# Patient Record
Sex: FEMALE | Race: ASIAN | NOT HISPANIC OR LATINO | URBAN - METROPOLITAN AREA
[De-identification: names, ages, dates, MRNs, and addresses within clinical notes are randomized per-mention and may not be internally consistent; named-entity substitution may affect disease eponyms.]

---
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
## 2019-09-15 ENCOUNTER — EMERGENCY (EMERGENCY)
Facility: HOSPITAL | Age: 19
LOS: 1 days | Discharge: HOME | End: 2019-09-15
Attending: EMERGENCY MEDICINE | Admitting: EMERGENCY MEDICINE
Payer: MEDICAID

## 2019-09-15 VITALS
HEART RATE: 101 BPM | RESPIRATION RATE: 16 BRPM | SYSTOLIC BLOOD PRESSURE: 139 MMHG | WEIGHT: 174.17 LBS | OXYGEN SATURATION: 98 % | DIASTOLIC BLOOD PRESSURE: 89 MMHG | TEMPERATURE: 99 F

## 2019-09-15 LAB
ALBUMIN SERPL ELPH-MCNC: 3.4 G/DL — SIGNIFICANT CHANGE UP (ref 3.4–5)
ALP SERPL-CCNC: 89 U/L — SIGNIFICANT CHANGE UP (ref 40–120)
ALT FLD-CCNC: 17 U/L — SIGNIFICANT CHANGE UP (ref 12–42)
ANION GAP SERPL CALC-SCNC: 8 MMOL/L — LOW (ref 9–16)
APPEARANCE UR: CLEAR — SIGNIFICANT CHANGE UP
AST SERPL-CCNC: 13 U/L — LOW (ref 15–37)
BASOPHILS NFR BLD AUTO: 0.2 % — SIGNIFICANT CHANGE UP (ref 0–2)
BILIRUB SERPL-MCNC: 0.2 MG/DL — SIGNIFICANT CHANGE UP (ref 0.2–1.2)
BILIRUB UR-MCNC: NEGATIVE — SIGNIFICANT CHANGE UP
BUN SERPL-MCNC: 15 MG/DL — SIGNIFICANT CHANGE UP (ref 7–23)
CALCIUM SERPL-MCNC: 9.8 MG/DL — SIGNIFICANT CHANGE UP (ref 8.5–10.5)
CHLORIDE SERPL-SCNC: 102 MMOL/L — SIGNIFICANT CHANGE UP (ref 96–108)
CO2 SERPL-SCNC: 29 MMOL/L — SIGNIFICANT CHANGE UP (ref 22–31)
COLOR SPEC: YELLOW — SIGNIFICANT CHANGE UP
CREAT SERPL-MCNC: 0.99 MG/DL — SIGNIFICANT CHANGE UP (ref 0.5–1.3)
DIFF PNL FLD: NEGATIVE — SIGNIFICANT CHANGE UP
EOSINOPHIL NFR BLD AUTO: 1.3 % — SIGNIFICANT CHANGE UP (ref 0–6)
GLUCOSE SERPL-MCNC: 280 MG/DL — HIGH (ref 70–99)
GLUCOSE UR QL: >=1000
HCG SERPL-ACNC: <1 MIU/ML — SIGNIFICANT CHANGE UP
HCG UR QL: NEGATIVE — SIGNIFICANT CHANGE UP
HCT VFR BLD CALC: 39.5 % — SIGNIFICANT CHANGE UP (ref 34.5–45)
HGB BLD-MCNC: 12.9 G/DL — SIGNIFICANT CHANGE UP (ref 11.5–15.5)
IMM GRANULOCYTES NFR BLD AUTO: 0.3 % — SIGNIFICANT CHANGE UP (ref 0–1.5)
KETONES UR-MCNC: NEGATIVE — SIGNIFICANT CHANGE UP
LEUKOCYTE ESTERASE UR-ACNC: NEGATIVE — SIGNIFICANT CHANGE UP
LIDOCAIN IGE QN: 129 U/L — SIGNIFICANT CHANGE UP (ref 73–393)
LYMPHOCYTES # BLD AUTO: 33.9 % — SIGNIFICANT CHANGE UP (ref 13–44)
MCHC RBC-ENTMCNC: 26.2 PG — LOW (ref 27–34)
MCHC RBC-ENTMCNC: 32.7 G/DL — SIGNIFICANT CHANGE UP (ref 32–36)
MCV RBC AUTO: 80.3 FL — SIGNIFICANT CHANGE UP (ref 80–100)
MONOCYTES NFR BLD AUTO: 6.1 % — SIGNIFICANT CHANGE UP (ref 2–14)
NEUTROPHILS NFR BLD AUTO: 58.2 % — SIGNIFICANT CHANGE UP (ref 43–77)
NITRITE UR-MCNC: NEGATIVE — SIGNIFICANT CHANGE UP
PH UR: 6 — SIGNIFICANT CHANGE UP (ref 5–8)
PLATELET # BLD AUTO: 273 K/UL — SIGNIFICANT CHANGE UP (ref 150–400)
POTASSIUM SERPL-MCNC: 4.3 MMOL/L — SIGNIFICANT CHANGE UP (ref 3.5–5.3)
POTASSIUM SERPL-SCNC: 4.3 MMOL/L — SIGNIFICANT CHANGE UP (ref 3.5–5.3)
PROT SERPL-MCNC: 7.6 G/DL — SIGNIFICANT CHANGE UP (ref 6.4–8.2)
PROT UR-MCNC: NEGATIVE MG/DL — SIGNIFICANT CHANGE UP
RBC # BLD: 4.92 M/UL — SIGNIFICANT CHANGE UP (ref 3.8–5.2)
RBC # FLD: 14.4 % — SIGNIFICANT CHANGE UP (ref 10.3–14.5)
SODIUM SERPL-SCNC: 139 MMOL/L — SIGNIFICANT CHANGE UP (ref 132–145)
SP GR SPEC: 1.02 — SIGNIFICANT CHANGE UP (ref 1–1.03)
UROBILINOGEN FLD QL: 0.2 E.U./DL — SIGNIFICANT CHANGE UP
WBC # BLD: 10.2 K/UL — SIGNIFICANT CHANGE UP (ref 3.8–10.5)
WBC # FLD AUTO: 10.2 K/UL — SIGNIFICANT CHANGE UP (ref 3.8–10.5)

## 2019-09-15 PROCEDURE — 99284 EMERGENCY DEPT VISIT MOD MDM: CPT

## 2019-09-15 RX ORDER — FLUCONAZOLE 150 MG/1
150 TABLET ORAL ONCE
Refills: 0 | Status: COMPLETED | OUTPATIENT
Start: 2019-09-15 | End: 2019-09-15

## 2019-09-15 RX ORDER — SODIUM CHLORIDE 9 MG/ML
1000 INJECTION INTRAMUSCULAR; INTRAVENOUS; SUBCUTANEOUS ONCE
Refills: 0 | Status: COMPLETED | OUTPATIENT
Start: 2019-09-15 | End: 2019-09-15

## 2019-09-15 RX ADMIN — FLUCONAZOLE 150 MILLIGRAM(S): 150 TABLET ORAL at 23:38

## 2019-09-15 RX ADMIN — Medication 30 MILLILITER(S): at 23:00

## 2019-09-15 RX ADMIN — SODIUM CHLORIDE 1000 MILLILITER(S): 9 INJECTION INTRAMUSCULAR; INTRAVENOUS; SUBCUTANEOUS at 22:22

## 2019-09-15 NOTE — ED PROVIDER NOTE - NSFOLLOWUPINSTRUCTIONS_ED_ALL_ED_FT
arrange follow up with your doctor this week to start treatment for diabetes     stay well hydrated    return to er for increased pain or any other concern

## 2019-09-15 NOTE — ED PROVIDER NOTE - PATIENT PORTAL LINK FT
You can access the FollowMyHealth Patient Portal offered by [facility] by registering at the following website: [email or web address]. By joining [email or web address]’s FollowMyHealth portal, you will also be able to view your health information using other applications (apps) compatible with our system.

## 2019-09-15 NOTE — ED ADULT TRIAGE NOTE - CHIEF COMPLAINT QUOTE
generalized abdominal pain x3day +nausea no vomiting endorses she was recently diagnosed with DM Type 2 and not on any medications and does not check her BS  denies fever chills or urinary symptoms LMP 8/20

## 2019-09-15 NOTE — ED PROVIDER NOTE - CARE PLAN
Principal Discharge DX:	Diabetes mellitus  Secondary Diagnosis:	Vaginal yeast infection  Secondary Diagnosis:	Abdominal pain, non-surgical

## 2019-09-15 NOTE — ED PROVIDER NOTE - CPE EDP GASTRO NORM
your ankle (not more than 5 pounds). With weight, you do not have to lift your leg more than 12 inches to get a hamstring workout. Shallow standing knee bends    Do this exercise only if you have very little pain; if you have no clicking, locking, or giving way if you have an injured knee; and if it does not hurt while you are doing 8 to 12 repetitions. 1. Stand with your hands lightly resting on a counter or chair in front of you. Put your feet shoulder-width apart. 2. Slowly bend your knees so that you squat down like you are going to sit in a chair. Make sure your knees do not go in front of your toes. 3. Lower yourself about 6 inches. Your heels should remain on the floor at all times. 4. Rise slowly to a standing position. Heel raises    1. Stand with your feet a few inches apart, with your hands lightly resting on a counter or chair in front of you. 2. Slowly raise your heels off the floor while keeping your knees straight. 3. Hold for about 6 seconds, then slowly lower your heels to the floor. 4. Do 8 to 12 repetitions several times during the day. Follow-up care is a key part of your treatment and safety. Be sure to make and go to all appointments, and call your doctor if you are having problems. It's also a good idea to know your test results and keep a list of the medicines you take. Where can you learn more? Go to [email or web address] and sign in to your [email or web address] account. Enter W198 in the [facility] box to learn more about \"Knee: Exercises. \"     If you do not have an account, please click on the \"Sign Up Now\" link. Current as of: November 29, 2017  Content Version: 11.7  © 3126-1863 [employer]. Care instructions adapted under license by [facility] ([facility]).  If you have questions about a medical condition or this instruction, always ask your healthcare professional. [address] any warranty or liability for your use of this
normal...

## 2019-09-15 NOTE — ED PROVIDER NOTE - OBJECTIVE STATEMENT
20 yo woman w/ complaint of 3 day of non progressive mid abdominal pain.  no n/v/d no fever no cp no sob no back pain. no dysuria or hematuria  no prior abd surgery

## 2019-09-15 NOTE — ED PROVIDER NOTE - CLINICAL SUMMARY MEDICAL DECISION MAKING FREE TEXT BOX
The patient's symptoms progressively improved throughout the ED stay.  abdominal pain resolved ED evaluation and management discussed with the patient and family (if available) in detail.  Close PMD and/or specialist follow up encouraged.  Strict ED return instructions discussed in detail for any worsening or new symptoms. The patient was given the opportunity to ask any questions about their discharge diagnosis and discharge instructions.  The patient verbalized understanding of these instructions and need to return to the ED for any worsening of illness or for any concern. The patient understands Emergency Department diagnosis is a preliminary diagnosis often based on limited information and that the patient must adhere to the follow-up plan as discussed. The patient tolerated PO fluids.  At the time of discharge from the Emergency Department, the patient is alert with fluent appropriate speech and ambulatory without difficulty.  A medical screening examination was performed and no emergency medical condition was identified.

## 2019-09-19 DIAGNOSIS — B37.3 CANDIDIASIS OF VULVA AND VAGINA: ICD-10-CM

## 2019-09-19 DIAGNOSIS — E11.9 TYPE 2 DIABETES MELLITUS WITHOUT COMPLICATIONS: ICD-10-CM

## 2019-09-19 DIAGNOSIS — R10.9 UNSPECIFIED ABDOMINAL PAIN: ICD-10-CM

## 2020-06-23 ENCOUNTER — EMERGENCY (EMERGENCY)
Facility: HOSPITAL | Age: 20
LOS: 1 days | Discharge: HOME | End: 2020-06-23
Attending: EMERGENCY MEDICINE | Admitting: EMERGENCY MEDICINE
Payer: MEDICAID

## 2020-06-23 VITALS
SYSTOLIC BLOOD PRESSURE: 129 MMHG | DIASTOLIC BLOOD PRESSURE: 76 MMHG | WEIGHT: 177.91 LBS | TEMPERATURE: 99 F | HEIGHT: 63 IN | OXYGEN SATURATION: 98 % | RESPIRATION RATE: 17 BRPM | HEART RATE: 105 BPM

## 2020-06-23 DIAGNOSIS — N30.90 CYSTITIS, UNSPECIFIED WITHOUT HEMATURIA: ICD-10-CM

## 2020-06-23 DIAGNOSIS — R30.0 DYSURIA: ICD-10-CM

## 2020-06-23 PROCEDURE — 99284 EMERGENCY DEPT VISIT MOD MDM: CPT

## 2020-06-23 RX ORDER — NITROFURANTOIN MACROCRYSTAL 50 MG
1 CAPSULE ORAL
Qty: 10 | Refills: 0
Start: 2020-06-23 | End: 2020-06-27

## 2020-06-23 RX ORDER — PHENAZOPYRIDINE HCL 100 MG
1 TABLET ORAL
Qty: 6 | Refills: 0
Start: 2020-06-23 | End: 2020-06-24

## 2020-06-23 RX ORDER — NITROFURANTOIN MACROCRYSTAL 50 MG
100 CAPSULE ORAL ONCE
Refills: 0 | Status: COMPLETED | OUTPATIENT
Start: 2020-06-23 | End: 2020-06-23

## 2020-06-23 RX ORDER — PHENAZOPYRIDINE HCL 100 MG
200 TABLET ORAL ONCE
Refills: 0 | Status: COMPLETED | OUTPATIENT
Start: 2020-06-23 | End: 2020-06-23

## 2020-06-23 RX ADMIN — Medication 100 MILLIGRAM(S): at 21:31

## 2020-06-23 RX ADMIN — Medication 200 MILLIGRAM(S): at 21:31

## 2020-06-23 NOTE — ED PROVIDER NOTE - OBJECTIVE STATEMENT
States that today she experience urinary frequency, urgency, dysuria, and hematuria.  Describes urine color as "pink"  Denies nausea, vomiting fever, chills or back pain.  Sexually active with one partner.  Denies hx of STIs.  No close contact with known or suspected COVID person(s).

## 2020-06-23 NOTE — ED PROVIDER NOTE - PATIENT PORTAL LINK FT
You can access the FollowMyHealth Patient Portal offered by [facility] by registering at the following website: [email or web address]. By joining [employer]’s FollowMyHealth portal, you will also be able to view your health information using other applications (apps) compatible with our system.

## 2020-06-23 NOTE — ED ADULT NURSE NOTE - NSIMPLEMENTINTERV_GEN_ALL_ED
Implemented All Universal Safety Interventions:  [place] to call system. Call bell, personal items and telephone within reach. Instruct patient to call for assistance. Room bathroom lighting operational. Non-slip footwear when patient is off stretcher. Physically safe environment: no spills, clutter or unnecessary equipment. Stretcher in lowest position, wheels locked, appropriate side rails in place.

## 2020-06-23 NOTE — ED ADULT NURSE NOTE - OBJECTIVE STATEMENT
c/o vaginal itching dysuria and hematuria x 1 day. Pt denies F,chills,n,v,cough,sob. sexually active.

## 2020-06-23 NOTE — ED PROVIDER NOTE - CLINICAL SUMMARY MEDICAL DECISION MAKING FREE TEXT BOX
UA consistent with cystitis.  Will treat empirically without urine culture.  Denies fever, chills, back pain.  Conservative management discussed with the patient in detail.  Close PMD follow up encouraged.  Strict ED return instructions discussed in detail and patient given the opportunity to ask any questions about their discharge diagnosis and instructions UA consistent with cystitis (possible hemorraghic component).  Will treat empirically with Macrobid without urine culture.  Denies fever, chills, back pain.  Conservative management discussed with the patient in detail.  Close PMD follow up encouraged.  Strict ED return instructions discussed in detail and patient given the opportunity to ask any questions about their discharge diagnosis and instructions

## 2020-10-17 ENCOUNTER — EMERGENCY (EMERGENCY)
Facility: HOSPITAL | Age: 20
LOS: 1 days | Discharge: HOME | End: 2020-10-17
Admitting: EMERGENCY MEDICINE
Payer: MEDICAID

## 2020-10-17 VITALS
DIASTOLIC BLOOD PRESSURE: 78 MMHG | HEART RATE: 92 BPM | RESPIRATION RATE: 18 BRPM | WEIGHT: 179.9 LBS | TEMPERATURE: 98 F | HEIGHT: 63 IN | SYSTOLIC BLOOD PRESSURE: 113 MMHG | OXYGEN SATURATION: 98 %

## 2020-10-17 DIAGNOSIS — R10.30 LOWER ABDOMINAL PAIN, UNSPECIFIED: ICD-10-CM

## 2020-10-17 DIAGNOSIS — N39.0 URINARY TRACT INFECTION, SITE NOT SPECIFIED: ICD-10-CM

## 2020-10-17 LAB
APPEARANCE UR: CLEAR — SIGNIFICANT CHANGE UP
BACTERIA # UR AUTO: PRESENT /HPF
BILIRUB UR-MCNC: NEGATIVE — SIGNIFICANT CHANGE UP
COLOR SPEC: YELLOW — SIGNIFICANT CHANGE UP
DIFF PNL FLD: NEGATIVE — SIGNIFICANT CHANGE UP
EPI CELLS # UR: SIGNIFICANT CHANGE UP /HPF (ref 0–5)
GLUCOSE UR QL: NEGATIVE — SIGNIFICANT CHANGE UP
HCG UR QL: NEGATIVE — SIGNIFICANT CHANGE UP
KETONES UR-MCNC: NEGATIVE — SIGNIFICANT CHANGE UP
LEUKOCYTE ESTERASE UR-ACNC: ABNORMAL
NITRITE UR-MCNC: NEGATIVE — SIGNIFICANT CHANGE UP
PH UR: 5.5 — SIGNIFICANT CHANGE UP (ref 5–8)
PROT UR-MCNC: NEGATIVE MG/DL — SIGNIFICANT CHANGE UP
RBC CASTS # UR COMP ASSIST: < 5 /HPF — SIGNIFICANT CHANGE UP
SP GR SPEC: >=1.03 — SIGNIFICANT CHANGE UP (ref 1–1.03)
UROBILINOGEN FLD QL: 0.2 E.U./DL — SIGNIFICANT CHANGE UP
WBC UR QL: ABNORMAL /HPF

## 2020-10-17 PROCEDURE — 99283 EMERGENCY DEPT VISIT LOW MDM: CPT

## 2020-10-17 RX ORDER — CEFUROXIME AXETIL 250 MG
250 TABLET ORAL ONCE
Refills: 0 | Status: COMPLETED | OUTPATIENT
Start: 2020-10-17 | End: 2020-10-17

## 2020-10-17 RX ORDER — CEFUROXIME AXETIL 250 MG
1 TABLET ORAL
Qty: 14 | Refills: 0
Start: 2020-10-17 | End: 2020-10-23

## 2020-10-17 RX ORDER — CEFUROXIME AXETIL 250 MG
1 TABLET ORAL
Qty: 14 | Refills: 0
Start: 2020-10-17 | End: 2021-04-16

## 2020-10-17 RX ADMIN — Medication 250 MILLIGRAM(S): at 15:56

## 2020-10-17 NOTE — ED ADULT NURSE NOTE - OBJECTIVE STATEMENT
Patient presented to the ED with cc of dysuria and increased frequency x several days. Patient denies vaginal pain, itching, burning or discharge. Patient able to provide a urine sample.

## 2020-10-17 NOTE — ED PROVIDER NOTE - ADDITIONAL RISK FACTOR FREE TEXT BOX
Denies fever, chills, hematuria, vaginal bleeding, d/c, abdominal pain, change in bowel function, flank pain, rash, HA, dizziness, SOB, CP, palpitations, diaphoresis, cough, and malaise.

## 2020-10-17 NOTE — ED PROVIDER NOTE - CLINICAL SUMMARY MEDICAL DECISION MAKING FREE TEXT BOX
Patient c/o burning with urination and lower abdominal pain x 1 week. Will obtain UA and culture. Anticipate discharge.

## 2020-10-17 NOTE — ED PROVIDER NOTE - PATIENT PORTAL LINK FT
You can access the FollowMyHealth Patient Portal offered by [facility] by registering at the following website: [email or web address]. By joining [employer]’s FollowMyHealth portal, you will also be able to view your health information using other applications (apps) compatible with our system.

## 2020-10-17 NOTE — ED ADULT NURSE NOTE - CHIEF COMPLAINT QUOTE
Pt complaining of burning with urination and lower abdominal pain x 1 week. Pt denies hematuria. LMP 9/16

## 2020-10-17 NOTE — ED PROVIDER NOTE - OBJECTIVE STATEMENT
21 y/o female presents to the ED with complaints of burning with urination and lower abdominal pain x 1 week. LN 9/16/20. Denies fever, chills, hematuria, vaginal bleeding, discharge, flank pain.

## 2021-04-08 ENCOUNTER — EMERGENCY (EMERGENCY)
Facility: HOSPITAL | Age: 21
LOS: 1 days | Discharge: HOME | End: 2021-04-08
Attending: EMERGENCY MEDICINE | Admitting: EMERGENCY MEDICINE
Payer: MEDICAID

## 2021-04-08 VITALS
HEART RATE: 100 BPM | RESPIRATION RATE: 16 BRPM | WEIGHT: 179.9 LBS | DIASTOLIC BLOOD PRESSURE: 75 MMHG | OXYGEN SATURATION: 99 % | SYSTOLIC BLOOD PRESSURE: 107 MMHG | HEIGHT: 63 IN | TEMPERATURE: 98 F

## 2021-04-08 DIAGNOSIS — R51.9 HEADACHE, UNSPECIFIED: ICD-10-CM

## 2021-04-08 DIAGNOSIS — Z79.2 LONG TERM (CURRENT) USE OF ANTIBIOTICS: ICD-10-CM

## 2021-04-08 DIAGNOSIS — U07.1 COVID-19: ICD-10-CM

## 2021-04-08 DIAGNOSIS — E11.9 TYPE 2 DIABETES MELLITUS WITHOUT COMPLICATIONS: ICD-10-CM

## 2021-04-08 DIAGNOSIS — Z79.891 LONG TERM (CURRENT) USE OF OPIATE ANALGESIC: ICD-10-CM

## 2021-04-08 LAB
ALBUMIN SERPL ELPH-MCNC: 3.8 G/DL — SIGNIFICANT CHANGE UP (ref 3.4–5)
ALP SERPL-CCNC: 74 U/L — SIGNIFICANT CHANGE UP (ref 40–120)
ALT FLD-CCNC: 19 U/L — SIGNIFICANT CHANGE UP (ref 12–42)
ANION GAP SERPL CALC-SCNC: 9 MMOL/L — SIGNIFICANT CHANGE UP (ref 9–16)
APPEARANCE UR: CLEAR — SIGNIFICANT CHANGE UP
AST SERPL-CCNC: 13 U/L — LOW (ref 15–37)
BASOPHILS # BLD AUTO: 0.02 K/UL — SIGNIFICANT CHANGE UP (ref 0–0.2)
BASOPHILS NFR BLD AUTO: 0.4 % — SIGNIFICANT CHANGE UP (ref 0–2)
BILIRUB SERPL-MCNC: 0.2 MG/DL — SIGNIFICANT CHANGE UP (ref 0.2–1.2)
BILIRUB UR-MCNC: ABNORMAL
BUN SERPL-MCNC: 10 MG/DL — SIGNIFICANT CHANGE UP (ref 7–23)
CALCIUM SERPL-MCNC: 9 MG/DL — SIGNIFICANT CHANGE UP (ref 8.5–10.5)
CHLORIDE SERPL-SCNC: 102 MMOL/L — SIGNIFICANT CHANGE UP (ref 96–108)
CO2 SERPL-SCNC: 26 MMOL/L — SIGNIFICANT CHANGE UP (ref 22–31)
COLOR SPEC: YELLOW — SIGNIFICANT CHANGE UP
CREAT SERPL-MCNC: 0.96 MG/DL — SIGNIFICANT CHANGE UP (ref 0.5–1.3)
DIFF PNL FLD: NEGATIVE — SIGNIFICANT CHANGE UP
EOSINOPHIL # BLD AUTO: 0.02 K/UL — SIGNIFICANT CHANGE UP (ref 0–0.5)
EOSINOPHIL NFR BLD AUTO: 0.4 % — SIGNIFICANT CHANGE UP (ref 0–6)
GLUCOSE SERPL-MCNC: 193 MG/DL — HIGH (ref 70–99)
GLUCOSE UR QL: NEGATIVE — SIGNIFICANT CHANGE UP
HCG UR QL: NEGATIVE — SIGNIFICANT CHANGE UP
HCT VFR BLD CALC: 43.5 % — SIGNIFICANT CHANGE UP (ref 34.5–45)
HGB BLD-MCNC: 14.1 G/DL — SIGNIFICANT CHANGE UP (ref 11.5–15.5)
IMM GRANULOCYTES NFR BLD AUTO: 0.4 % — SIGNIFICANT CHANGE UP (ref 0–1.5)
KETONES UR-MCNC: NEGATIVE — SIGNIFICANT CHANGE UP
LEUKOCYTE ESTERASE UR-ACNC: NEGATIVE — SIGNIFICANT CHANGE UP
LIDOCAIN IGE QN: 72 U/L — LOW (ref 73–393)
LYMPHOCYTES # BLD AUTO: 1.68 K/UL — SIGNIFICANT CHANGE UP (ref 1–3.3)
LYMPHOCYTES # BLD AUTO: 35.2 % — SIGNIFICANT CHANGE UP (ref 13–44)
MCHC RBC-ENTMCNC: 26.2 PG — LOW (ref 27–34)
MCHC RBC-ENTMCNC: 32.4 GM/DL — SIGNIFICANT CHANGE UP (ref 32–36)
MCV RBC AUTO: 80.7 FL — SIGNIFICANT CHANGE UP (ref 80–100)
MONOCYTES # BLD AUTO: 0.36 K/UL — SIGNIFICANT CHANGE UP (ref 0–0.9)
MONOCYTES NFR BLD AUTO: 7.5 % — SIGNIFICANT CHANGE UP (ref 2–14)
NEUTROPHILS # BLD AUTO: 2.67 K/UL — SIGNIFICANT CHANGE UP (ref 1.8–7.4)
NEUTROPHILS NFR BLD AUTO: 56.1 % — SIGNIFICANT CHANGE UP (ref 43–77)
NITRITE UR-MCNC: NEGATIVE — SIGNIFICANT CHANGE UP
NRBC # BLD: 0 /100 WBCS — SIGNIFICANT CHANGE UP (ref 0–0)
PH UR: 6 — SIGNIFICANT CHANGE UP (ref 5–8)
PLATELET # BLD AUTO: 247 K/UL — SIGNIFICANT CHANGE UP (ref 150–400)
POTASSIUM SERPL-MCNC: 4.1 MMOL/L — SIGNIFICANT CHANGE UP (ref 3.5–5.3)
POTASSIUM SERPL-SCNC: 4.1 MMOL/L — SIGNIFICANT CHANGE UP (ref 3.5–5.3)
PROT SERPL-MCNC: 8 G/DL — SIGNIFICANT CHANGE UP (ref 6.4–8.2)
PROT UR-MCNC: ABNORMAL MG/DL
RBC # BLD: 5.39 M/UL — HIGH (ref 3.8–5.2)
RBC # FLD: 15.2 % — HIGH (ref 10.3–14.5)
SARS-COV-2 RNA SPEC QL NAA+PROBE: DETECTED
SODIUM SERPL-SCNC: 137 MMOL/L — SIGNIFICANT CHANGE UP (ref 132–145)
SP GR SPEC: >=1.03 — SIGNIFICANT CHANGE UP (ref 1–1.03)
UROBILINOGEN FLD QL: 0.2 E.U./DL — SIGNIFICANT CHANGE UP
WBC # BLD: 4.77 K/UL — SIGNIFICANT CHANGE UP (ref 3.8–10.5)
WBC # FLD AUTO: 4.77 K/UL — SIGNIFICANT CHANGE UP (ref 3.8–10.5)

## 2021-04-08 PROCEDURE — 93010 ELECTROCARDIOGRAM REPORT: CPT

## 2021-04-08 PROCEDURE — 71045 X-RAY EXAM CHEST 1 VIEW: CPT | Mod: 26

## 2021-04-08 PROCEDURE — 99285 EMERGENCY DEPT VISIT HI MDM: CPT | Mod: 25

## 2021-04-08 RX ORDER — MECLIZINE HCL 12.5 MG
25 TABLET ORAL ONCE
Refills: 0 | Status: DISCONTINUED | OUTPATIENT
Start: 2021-04-08 | End: 2021-04-08

## 2021-04-08 RX ORDER — ACETAMINOPHEN 500 MG
650 TABLET ORAL ONCE
Refills: 0 | Status: COMPLETED | OUTPATIENT
Start: 2021-04-08 | End: 2021-04-08

## 2021-04-08 RX ORDER — SODIUM CHLORIDE 9 MG/ML
1000 INJECTION INTRAMUSCULAR; INTRAVENOUS; SUBCUTANEOUS ONCE
Refills: 0 | Status: COMPLETED | OUTPATIENT
Start: 2021-04-08 | End: 2021-04-08

## 2021-04-08 RX ADMIN — SODIUM CHLORIDE 1000 MILLILITER(S): 9 INJECTION INTRAMUSCULAR; INTRAVENOUS; SUBCUTANEOUS at 11:31

## 2021-04-08 RX ADMIN — Medication 650 MILLIGRAM(S): at 11:30

## 2021-04-08 NOTE — ED PROVIDER NOTE - CLINICAL SUMMARY MEDICAL DECISION MAKING FREE TEXT BOX
20F w/ T2DM p/w body aches, chest fullness, subjective fevers, nausea, no known covid contacts, FS 170s here, no abdominal ttp, doubt surgical pathology, doubt dka, vss, will obtain labs, hydrate, cxr, covid swab, reassess

## 2021-04-08 NOTE — ED PROVIDER NOTE - OBJECTIVE STATEMENT
20F w/ h/o T2DM p/w chest 'fullness', back aches, muscle aches throughout, nausea, headache. States she noted her sugars were in the 200s yesterday, improved today. Denies vomiting, abdominal pain. Endorses dysuria, denies polyuria/polydipsia.

## 2021-04-08 NOTE — ED PROVIDER NOTE - NSFOLLOWUPINSTRUCTIONS_ED_ALL_ED_FT
COVID-19 is the disease caused by a coronavirus first discovered in 2019. Coronaviruses generally cause upper respiratory (nose, throat, and lung) infections, such as a cold. The new virus can also cause serious lower respiratory conditions, such as pneumonia or acute respiratory distress syndrome (ARDS). The new virus can also affect many other organs, including the brain and heart. Blood vessels can also be affected, leading to blood clots. Anyone can develop serious problems from the new virus, but your risk is higher if you are 65 or older. A weak immune system, diabetes, or a heart or lung condition can also increase your risk. Your risk is also higher if you are a current or former cigarette smoker.    DISCHARGE INSTRUCTIONS:    If you think you or someone you know may be infected: Do the following to protect others:   •If emergency care is needed, tell the  about the possible infection, or call ahead and tell the emergency department.      •Call a healthcare provider for instructions if symptoms are mild. Anyone who may be infected should not arrive without calling first. The provider will need to protect staff members and other patients.      •The person who may be infected needs to wear a face covering while getting medical care. This will help lower the risk of infecting others. Coverings are not used for anyone who is younger than 2 years, has breathing problems, or cannot remove it. The provider can give you instructions for anyone who cannot wear a covering.      Call your local emergency number (911 in the [place]) or an emergency department if:   •You have trouble breathing or shortness of breath at rest.      •You have chest pain or pressure that lasts longer than 5 minutes.      •You become confused or hard to wake.      •Your lips or face are blue.      •You have a fever of 104°F (40°C) or higher.      Call your doctor if:   •You do not have symptoms of COVID-19 but had close physical contact within 14 days with someone who tested positive.      •You have questions or concerns about your condition or care.      Medicines: You may need any of the following for mild symptoms:   •Decongestants help reduce nasal congestion and help you breathe more easily. If you take decongestant pills, they may make you feel restless or cause problems with your sleep. Do not use decongestant sprays for more than a few days.      •Cough suppressants help reduce coughing. Ask your healthcare provider which type of cough medicine is best for you.      •Acetaminophen decreases pain and fever. It is available without a doctor's order. Ask how much to take and how often to take it. Follow directions. Read the labels of all other medicines you are using to see if they also contain acetaminophen, or ask your doctor or pharmacist. Acetaminophen can cause liver damage if not taken correctly. Do not use more than 4 grams (4,000 milligrams) total of acetaminophen in one day.       •NSAIDs, such as ibuprofen, help decrease swelling, pain, and fever. NSAIDs can cause stomach bleeding or kidney problems in certain people. If you take blood thinner medicine, always ask your healthcare provider if NSAIDs are safe for you. Always read the medicine label and follow directions.      •Take your medicine as directed. Contact your healthcare provider if you think your medicine is not helping or if you have side effects. Tell him or her if you are allergic to any medicine. Keep a list of the medicines, vitamins, and herbs you take. Include the amounts, and when and why you take them. Bring the list or the pill bottles to follow-up visits. Carry your medicine list with you in case of an emergency.      How the 2019 coronavirus spreads: The virus spreads quickly and easily. The virus can be passed starting 2 days before symptoms begin or before a positive test if symptoms never begin. The following are ways the virus is thought to spread, but more information may be coming:   •Droplets are the main way all coronaviruses spread. The virus travels in droplets that form when a person talks, coughs, or sneezes. The droplets can also float in the air for minutes or hours. Infection happens when you breathe in the droplets or get them in your eyes or nose. Close personal contact with an infected person increases your risk for infection. This means being within 6 feet (2 meters) of the person for at least 15 minutes over 24 hours.      •Person-to-person contact can spread the virus. For example, a person with the virus on his or her hands can spread it by shaking hands with someone.      •The virus can stay on objects and surfaces for a short time. You may become infected by touching the object or surface and then touching your eyes or mouth.      •An infected animal may be able to infect a person who touches it. This may happen at live markets or on a farm.      Help lower the risk for COVID-19: The best way to prevent infection is to avoid anyone who is infected, but this can be hard to do. An infected person can spread the virus before signs or symptoms begin, or even if signs or symptoms never develop. The following can help lower the risk for infection:     Prevent COVID-19 Infection     •Wash your hands often throughout the day. Use soap and water. Rub your soapy hands together, lacing your fingers, for at least 20 seconds. Rinse with warm, running water. Dry your hands with a clean towel or paper towel. Use hand  that contains alcohol if soap and water are not available. Teach children how to wash their hands and use hand .  Handwashing           •Cover sneezes and coughs. Turn your face away and cover your mouth and nose with a tissue. Throw the tissue away. Use the bend of your arm if a tissue is not available. Then wash your hands well with soap and water or use hand . Teach children how to cover a cough or sneeze.      •Wear a face covering (mask) around anyone who does not live in your home. Use a disposable non-medical mask, or make a cloth covering with at least 2 layers. Cover your mouth and your nose. Securely fasten it under your chin and on the sides of your face. Do not wear a plastic face shield instead of a covering. Continue social distancing and washing your hands often. A face covering is not a substitute for social distancing safety measures.  How to Wear a Face Covering (Mask)           •Follow worldwide, national, and local social distancing guidelines. Keep at least 6 feet (2 meters) between you and others. Also keep this distance from anyone who comes to your home, such as someone making a delivery.      •Make a habit of not touching your face. If you get the virus on your hands, you can transfer it to your eyes, nose, or mouth and become infected. You can also transfer it to objects, surfaces, or people. Do not touch your eyes, nose, or mouth without washing your hands first.      •Clean and disinfect high-touch surfaces and objects often. Use disinfecting wipes, or make a solution of 4 teaspoons of bleach in 1 quart (4 cups) of water. Clean and disinfect even if you think no one living in or coming to your home is infected with the virus.      •Ask about vaccines you may need. A COVID-19 vaccine is a shot given to help prevent infection caused by the novel coronavirus. Your healthcare provider can give you more information about when a vaccine may be available to you. Get the influenza (flu) vaccine as soon as recommended each year, usually starting in September or October. Get the pneumonia vaccine if recommended.      Follow social distancing guidelines: National and local social distancing rules vary. Rules may change over time as restrictions are lifted. Restrictions may return if an outbreak happens where you live. It is important to know and follow all current social distancing rules in your area. The following are general guidelines:  •Stay home if you are sick or think you may have COVID-19. It is important to stay home if you are waiting for a testing appointment or for test results. Even if you do not have symptoms, you can pass the virus to others.      •Limit trips out of your home. Have food, medicines, and other supplies delivered and left at your door or other area, if possible. Plan trips out of your home so you make the fewest stops possible to limit close personal contact. Keep track of places you go. This will help contact tracers notify others if you become infected.      •Avoid close physical contact with anyone who does not live in your home. Do not shake hands with, hug, or kiss a person as a greeting. If you must use public transportation (such as a bus or subway), try to sit or stand away from others. Only allow necessary people into your home. Wear your face covering, and remind others to wear a face covering. Remind them to wash their hands when they arrive and before they leave. Do not let someone into your home or go to someone's home just to visit. Even if you both do not feel sick, the virus can pass from one of you to the other.      •Avoid in-person gatherings and crowds. Gatherings or crowds of 10 or more individuals can cause the virus to spread. Avoid places such as [place], beaches, sporting events, and tourist attractions. For events such as parties, holiday meals, Zoroastrian services, and conferences, attend virtually (on a computer), if possible.      •Ask your healthcare provider for other ways to have appointments. Some providers offer phone, video, or other types of appointments. You may also be able to get prescriptions for a few months of your medicines at a time.      •Stay safe if you must go out to work. Keep physical distance between you and other workers as much as possible. Follow your employer's rules so everyone stays safe.      If you have COVID-19 and are recovering at home, healthcare providers will give you specific instructions to follow. The following are general guidelines to remind you how to keep others safe until you are well:   •Wash your hands often. Use soap and water as much as possible. Use hand  that contains alcohol if soap and water are not available. Dry your hands with a clean towel or paper towel. Do not share towels with anyone. If you use paper towels, throw them away in a lined trash can kept in your room or area. Use a covered trash can, if possible.      •Do not go out of your home unless it is necessary. Ask someone who is not infected to go out for groceries or supplies, or have them delivered. Do not go to your healthcare provider's office without an appointment.      •Only have close physical contact with a person giving direct care, or a baby or child you must care for. Family members and friends should not visit you. If possible, stay in a separate area or room of your home if you live with others. No one should go into the area or room except to give you care. You can visit with others by phone, video chat, e-mail, or similar systems.      •Wear a face covering while others are near you. This can help prevent droplets from spreading the virus when you talk, sneeze, or cough. Put the covering on before anyone comes into your room or area. Remind the person to cover his or her nose and mouth before coming in to provide care for you.      •Do not share items. Do not share dishes, towels, or other items with anyone. Items need to be washed after you use them.      •Protect your baby. Some newborns have tested positive for the virus. It is not known if they became infected before or after birth. The highest risk is when a  has close contact with an infected person. If you are pregnant or breastfeeding, talk to your healthcare provider or obstetrician about any concerns you have. He or she will tell you when to bring your baby in for check-ups and vaccines. He or she will also tell you what to do if you think your baby was infected with the coronavirus. Wash your hands and put on a clean face covering before you breastfeed or care for your baby.      •Do not handle live animals unless it is necessary. Some animals, including pets, have been infected with the new coronavirus. Ask someone who is not infected to take care of your pet until you are well. If you must care for a pet, wear a face covering. Wash your hands before and after you give care. Talk to your healthcare provider about how to keep a service animal safe, if needed.      •Follow directions from your healthcare provider for being around others after you recover. It is not known if or for how long a recovered person can pass the virus to others. Your provider may give you instructions, such as continuing social distancing and wearing a face covering. He or she will tell you when it is okay to be around others again. This may be 10 to 20 days after symptoms started or you had a positive test. Most symptoms will also need to be gone. Your provider will give you more information.      Follow up with your doctor as directed: Write down your questions so you remember to ask them during your visits.    For more information:   •Centers for Disease Control and Prevention  [address] [place] [address]  Phone: 1-863.953.7214  Web Address: http://www.cdc.gov    - Follow up with your primary care doctor in 1-2 days.    - Bring results with you to the appointment.   - Take tylenol 650mg or motrin 600mg every 6 hours for pain or fever.   - Return to the ED for new or worsening symptoms.

## 2021-04-08 NOTE — ED PROVIDER NOTE - PATIENT PORTAL LINK FT
You can access the FollowMyHealth Patient Portal offered by [facility] by registering at the following website: [email or web address]. By joining [employer]’s FollowMyHealth portal, you will also be able to view your health information using other applications (apps) compatible with our system.

## 2021-04-08 NOTE — ED PROVIDER NOTE - PROGRESS NOTE DETAILS
Perkins: patient feeling better, offered monoclonal ab, states she will consider it, return precautions provided

## 2021-04-08 NOTE — ED PROVIDER NOTE - NS ED ROS FT
General: + fever, + chills  HENT: + nasal congestion, + rhinorrhea  CV: + chest fullness, denies palpitations   Resp: denies difficulty breathing, cough  Abdominal: + nausea, denies vomiting, diarrhea, abdominal pain  MSK: + muscle aches, denies leg swelling  Neuro: denies headaches, numbness, tingling  Skin: denies rashes, bruises  Heme: denies petechia, abnormal bleeding

## 2021-04-08 NOTE — ED PROVIDER NOTE - ATTENDING CONTRIBUTION TO CARE
21 yo female with type 2 DM COVID (+), obese, labs OK, offered ab infusion states she will consider it and requested to go home. Rx supportive care at home, return for progressive or worsening sx.

## 2021-04-08 NOTE — ED ADULT NURSE NOTE - OBJECTIVE STATEMENT
Pt walked in c/o chest pain that radiates to upper back and burning upon urination on and off for 1 x day hx dm 2 in no acute distress denies any respiratory issues adrien shaffer

## 2021-07-16 NOTE — ED ADULT NURSE NOTE - NS ED NURSE LEVEL OF CONSCIOUSNESS ORIENTATION
If provider orders tests at today's visit, patient would like to be contacted via [employer].  If to contact patient by phone, patient's preferred phone # is [phone] (Cell) and it is OK to leave message on voice mail or with family member.  If medications are ordered at today's visit, the pharmacy name/location patient would like them to be sent to is   [id number] - [place] - [address] AT [facility]  [address] [address]  Phone: [phone] Fax: [phone]         Oriented - self; Oriented - place; Oriented - time

## 2022-05-10 ENCOUNTER — EMERGENCY (EMERGENCY)
Facility: HOSPITAL | Age: 22
LOS: 1 days | Discharge: HOME | End: 2022-05-10
Admitting: EMERGENCY MEDICINE
Payer: MEDICAID

## 2022-05-10 VITALS
DIASTOLIC BLOOD PRESSURE: 81 MMHG | WEIGHT: 177.91 LBS | HEIGHT: 63 IN | SYSTOLIC BLOOD PRESSURE: 125 MMHG | TEMPERATURE: 98 F | RESPIRATION RATE: 15 BRPM | HEART RATE: 96 BPM | OXYGEN SATURATION: 98 %

## 2022-05-10 PROCEDURE — 99283 EMERGENCY DEPT VISIT LOW MDM: CPT

## 2022-05-10 RX ORDER — NYSTATIN CREAM 100000 [USP'U]/G
1 CREAM TOPICAL ONCE
Refills: 0 | Status: COMPLETED | OUTPATIENT
Start: 2022-05-10 | End: 2022-05-10

## 2022-05-10 RX ORDER — FLUCONAZOLE 150 MG/1
150 TABLET ORAL ONCE
Refills: 0 | Status: COMPLETED | OUTPATIENT
Start: 2022-05-10 | End: 2022-05-10

## 2022-05-10 RX ADMIN — NYSTATIN CREAM 1 APPLICATION(S): 100000 CREAM TOPICAL at 20:39

## 2022-05-10 RX ADMIN — Medication 300 MILLIGRAM(S): at 20:39

## 2022-05-10 RX ADMIN — FLUCONAZOLE 150 MILLIGRAM(S): 150 TABLET ORAL at 20:39

## 2022-05-10 NOTE — ED PROVIDER NOTE - PHYSICAL EXAMINATION
Gen - WDWN F, NAD, comfortable and non-toxic appearing  Skin - warm, dry, intact, umbilicus region with mild erythema and irritation, minimally TTP, no focal fluctuance, d/c, crepitus, bleeding, streaking, warmth, or edema    HEENT - AT/NC, no nasal discharge, airway patent, neck supple and FROM  CV - S1S2, R/R/R  Resp - CTAB, no r/r/w  GI - soft, ND, NT, no CVAT b/l    MS - No acute or gross deformities noted to extremities. No midline spinal tenderness or step off on palpation  Neuro - AxOx3, ambulatory without gait disturbance

## 2022-05-10 NOTE — ED PROVIDER NOTE - CLINICAL SUMMARY MEDICAL DECISION MAKING FREE TEXT BOX
pt p/w itching and redness x 5 to the umbilicus region, afebrile, no systemic sx, no insect bites noted, abd soft, exam suggestive of mild uncomplicated omphalitis with possible secondary yeast infection due to body habitus, FS wnl, pt's DM is under control with oral antihyperglycemic, given dose of diflucan and clindamycin, will dc home on course of clindamycin and nystatin powder, wound care instructions and strict return precautions discussed, f/u with PMD or derm, pt verbalized understanding

## 2022-05-10 NOTE — ED ADULT NURSE NOTE - DATE OF LAST VACCINATION
Bedside and Verbal shift change report given to Rosa Rodriguez RN (oncoming nurse) by Theodore Ram RN (offgoing nurse). Report included the following information SBAR, Kardex, Intake/Output and MAR. 09-Feb-2021

## 2022-05-10 NOTE — ED PROVIDER NOTE - OBJECTIVE STATEMENT
22 yo F with PMHx of NIDDM, 22 yo F with PMHx of NIDDM, last hgbA1C 6 1 month ago, presenting c/o itching and mild discomfort/redness around the umbilical region.  Pt reports gradual onset of itching and redness inside the umbilicus x 5d ago.  Noted progressive worsening redness and mild discomfort today.  Noted mild whitish collection inside the umbilicus.  Denies fever, chills, FB sensation, trauma, change in sensation, paresthesia, purulent d/c, bleeding, abdominal pain, N/V/D/C, rash, HA, dizziness, LOC, CP, SOB, streaking, insect bite, and focal weakness

## 2022-05-10 NOTE — ED PROVIDER NOTE - CARE PROVIDER_API CALL
your PMD,   Phone: (   )    -  Fax: (   )    -  Follow Up Time:     Brianna Jensen)  Dermatology  [address] [place] [address], [place] [address]  Phone: [phone]  Fax: [phone]  Follow Up Time:

## 2022-05-10 NOTE — ED PROVIDER NOTE - PATIENT PORTAL LINK FT
You can access the FollowMyHealth Patient Portal offered by [facility] by registering at the following website: [email or web address]. By joining [email or web address]’s FollowMyHealth portal, you will also be able to view your health information using other applications (apps) compatible with our system.

## 2022-05-11 PROBLEM — E11.9 TYPE 2 DIABETES MELLITUS WITHOUT COMPLICATIONS: Chronic | Status: ACTIVE | Noted: 2021-04-08

## 2022-05-12 DIAGNOSIS — E11.9 TYPE 2 DIABETES MELLITUS WITHOUT COMPLICATIONS: ICD-10-CM

## 2022-05-12 DIAGNOSIS — L29.9 PRURITUS, UNSPECIFIED: ICD-10-CM

## 2022-05-12 DIAGNOSIS — L08.82 OMPHALITIS NOT OF NEWBORN: ICD-10-CM

## 2022-05-12 DIAGNOSIS — B37.9 CANDIDIASIS, UNSPECIFIED: ICD-10-CM

## 2022-06-18 ENCOUNTER — EMERGENCY (EMERGENCY)
Facility: HOSPITAL | Age: 22
LOS: 1 days | Discharge: HOME | End: 2022-06-18
Attending: EMERGENCY MEDICINE | Admitting: EMERGENCY MEDICINE
Payer: MEDICAID

## 2022-06-18 VITALS
TEMPERATURE: 98 F | RESPIRATION RATE: 17 BRPM | OXYGEN SATURATION: 99 % | HEART RATE: 87 BPM | SYSTOLIC BLOOD PRESSURE: 111 MMHG | DIASTOLIC BLOOD PRESSURE: 75 MMHG

## 2022-06-18 VITALS
RESPIRATION RATE: 16 BRPM | WEIGHT: 169.98 LBS | HEART RATE: 92 BPM | TEMPERATURE: 98 F | SYSTOLIC BLOOD PRESSURE: 106 MMHG | HEIGHT: 63 IN | OXYGEN SATURATION: 100 % | DIASTOLIC BLOOD PRESSURE: 72 MMHG

## 2022-06-18 LAB
APPEARANCE UR: CLEAR — SIGNIFICANT CHANGE UP
BACTERIA # UR AUTO: ABNORMAL /HPF
BILIRUB UR-MCNC: NEGATIVE — SIGNIFICANT CHANGE UP
COLOR SPEC: YELLOW — SIGNIFICANT CHANGE UP
DIFF PNL FLD: ABNORMAL
EPI CELLS # UR: SIGNIFICANT CHANGE UP /HPF (ref 0–5)
GLUCOSE BLDC GLUCOMTR-MCNC: 124 MG/DL — HIGH (ref 70–99)
GLUCOSE UR QL: >=1000
HCG UR QL: NEGATIVE — SIGNIFICANT CHANGE UP
KETONES UR-MCNC: ABNORMAL MG/DL
LEUKOCYTE ESTERASE UR-ACNC: NEGATIVE — SIGNIFICANT CHANGE UP
NITRITE UR-MCNC: POSITIVE
PH UR: 5.5 — SIGNIFICANT CHANGE UP (ref 5–8)
PROT UR-MCNC: NEGATIVE MG/DL — SIGNIFICANT CHANGE UP
RBC CASTS # UR COMP ASSIST: ABNORMAL /HPF
SP GR SPEC: 1.02 — SIGNIFICANT CHANGE UP (ref 1–1.03)
UROBILINOGEN FLD QL: 0.2 E.U./DL — SIGNIFICANT CHANGE UP
WBC UR QL: < 5 /HPF — SIGNIFICANT CHANGE UP

## 2022-06-18 PROCEDURE — 99283 EMERGENCY DEPT VISIT LOW MDM: CPT

## 2022-06-18 RX ORDER — FLUCONAZOLE 150 MG/1
1 TABLET ORAL
Qty: 1 | Refills: 0
Start: 2022-06-18 | End: 2022-06-18

## 2022-06-18 RX ORDER — FLUCONAZOLE 150 MG/1
150 TABLET ORAL ONCE
Refills: 0 | Status: COMPLETED | OUTPATIENT
Start: 2022-06-18 | End: 2022-06-18

## 2022-06-18 RX ADMIN — FLUCONAZOLE 150 MILLIGRAM(S): 150 TABLET ORAL at 16:10

## 2022-06-18 NOTE — ED PROVIDER NOTE - DOMESTIC TRAVEL HIGH RISK QUESTION
No
Depressed    Gastroesophageal reflux disease with esophagitis    Hypercholesteremia    Hypertension    Hypothyroid    Obesity (BMI 35.0-39.9 without comorbidity)    Overactive bladder

## 2022-06-18 NOTE — ED PROVIDER NOTE - PHYSICAL EXAMINATION
Constitutional:  Well appearing, awake, alert, oriented to person, place, time/situation and in no apparent distress.  HEENT: Airway patent, Nasal mucosa clear. Mouth with normal mucosa.   Eyes: Clear bilaterally, pupils equal, round and reactive to light.  Cardiac: Normal rate, regular rhythm.  Respiratory: Breath sounds clear and equal bilaterally.  GI: Abdomen soft, non-tender, no guarding.  : mild irritation of labia minora; no ulcerations or other skin lesions; internal exam deferred per pt preference  MSK: Spine appears normal, range of motion is not limited, no muscle or joint tenderness  Neuro: Alert and oriented, no focal deficits, no motor or sensory deficits.  Skin: Skin normal color for race, warm, dry and intact. No evidence of rash.

## 2022-06-18 NOTE — ED PROVIDER NOTE - PATIENT PORTAL LINK FT
You can access the FollowMyHealth Patient Portal offered by [facility] by registering at the following website: [email or web address]. By joining [employer]’s FollowMyHealth portal, you will also be able to view your health information using other applications (apps) compatible with our system.

## 2022-06-18 NOTE — ED PROVIDER NOTE - CLINICAL SUMMARY MEDICAL DECISION MAKING FREE TEXT BOX
21yo F hx of DM2 presents with vaginal itching, burning discomfort, and white dc x1wk.  On exam afebrile, VSS, well appearing, with mild irritation of external genitalia, no ulcers to suggest HSV.  Suspect yeast infection based on symptoms and hx of DM.  Gc/chlamydia test sent.  UA with +blood and bacteria, likely due to pt being on her period.  Culture sent.  Will not treat UA due to concern for worsening yeast infection.  Will give diflucan now and another dose to pharmacy to use as needed.

## 2022-06-18 NOTE — ED PROVIDER NOTE - NSFOLLOWUPINSTRUCTIONS_ED_ALL_ED_FT
Vaginal Yeast Infection, Adult    Female body with a close-up showing the vagina with a yeast infection.   Vaginal yeast infection is a condition that causes vaginal discharge as well as soreness, swelling, and redness (inflammation) of the vagina. This is a common condition. Some women get this infection frequently.      What are the causes?    This condition is caused by a change in the normal balance of the yeast (Candida) and normal bacteria that live in the vagina. This change causes an overgrowth of yeast, which causes the inflammation.      What increases the risk?    The condition is more likely to develop in women who:  •Take antibiotic medicines.      •Have diabetes.      •Take birth control pills.      •Are pregnant.      •Douche often.      •Have a weak body defense system (immune system).      •Have been taking steroid medicines for a long time.      •Frequently wear tight clothing.        What are the signs or symptoms?    Symptoms of this condition include:  •White, thick, creamy vaginal discharge.      •Swelling, itching, redness, and irritation of the vagina. The lips of the vagina (labia) may be affected as well.      •Pain or a burning feeling while urinating.      •Pain during sex.        How is this diagnosed?    This condition is diagnosed based on:  •Your medical history.      •A physical exam.      •A pelvic exam. Your health care provider will examine a sample of your vaginal discharge under a microscope. Your health care provider may send this sample for testing to confirm the diagnosis.        How is this treated?    This condition is treated with medicine. Medicines may be over-the-counter or prescription. You may be told to use one or more of the following:  •Medicine that is taken by mouth (orally).      •Medicine that is applied as a cream (topically).      •Medicine that is inserted directly into the vagina (suppository).        Follow these instructions at home:    •Take or apply over-the-counter and prescription medicines only as told by your health care provider.      • Do not use tampons until your health care provider approves.      • Do not have sex until your infection has cleared. Sex can prolong or worsen your symptoms of infection. Ask your health care provider when it is safe to resume sexual activity.      •Keep all follow-up visits. This is important.        How is this prevented?  A sign showing that a person should not douche.   • Do not wear tight clothes, such as pantyhose or tight pants.      •Wear breathable cotton underwear.      • Do not use douches, perfumed soap, creams, or powders.      •Wipe from front to back after using the toilet.      •If you have diabetes, keep your blood sugar levels under control.      •Ask your health care provider for other ways to prevent yeast infections.        Contact a health care provider if:    •You have a fever.      •Your symptoms go away and then return.      •Your symptoms do not get better with treatment.      •Your symptoms get worse.      •You have new symptoms.      •You develop blisters in or around your vagina.      •You have blood coming from your vagina and it is not your menstrual period.      •You develop pain in your abdomen.        Summary    •Vaginal yeast infection is a condition that causes discharge as well as soreness, swelling, and redness (inflammation) of the vagina.      •This condition is treated with medicine. Medicines may be over-the-counter or prescription.      •Take or apply over-the-counter and prescription medicines only as told by your health care provider.      • Do not douche. Resume sexual activity or use of tampons as instructed by your health care provider.      •Contact a health care provider if your symptoms do not get better with treatment or your symptoms go away and then return.      This information is not intended to replace advice given to you by your health care provider. Make sure you discuss any questions you have with your health care provider.

## 2022-06-18 NOTE — ED ADULT NURSE NOTE - OBJECTIVE STATEMENT
Pt presents to ED complaining of vaginal burning for a week. Denies irregular discharge. Endorses being on her period.

## 2022-06-18 NOTE — ED PROVIDER NOTE - OBJECTIVE STATEMENT
23yo F hx of DM2, presents with vaginal itching and burning discomfort x1wk, assoc w thick white discharge.  States symptoms became worse after visiting [place] where she was dressed unseasonably warmly and walking around more than usual.  No recent abx.  No dysuria or hematuria.  Sexually active; no pain with intercourse.  No green or yellow vag dc.  Not concerned about STI.  No abd pain.

## 2022-06-20 LAB
C TRACH RRNA SPEC QL NAA+PROBE: SIGNIFICANT CHANGE UP
N GONORRHOEA RRNA SPEC QL NAA+PROBE: SIGNIFICANT CHANGE UP
SPECIMEN SOURCE: SIGNIFICANT CHANGE UP

## 2022-06-20 RX ORDER — CEFUROXIME AXETIL 250 MG
1 TABLET ORAL
Qty: 10 | Refills: 0
Start: 2022-06-20 | End: 2022-06-24

## 2022-06-20 NOTE — ED POST DISCHARGE NOTE - DETAILS
[facility] 6/20/22 1015am. sent rx for ceftin. [facility] 6/20/22 1015am. sent rx for ceftin. - Carolina discussed results and plan for abx. pt understands and agrees. all questions answered. - Carolina

## 2022-06-21 DIAGNOSIS — B37.3 CANDIDIASIS OF VULVA AND VAGINA: ICD-10-CM

## 2022-06-21 DIAGNOSIS — L29.8 OTHER PRURITUS: ICD-10-CM

## 2022-06-21 DIAGNOSIS — E11.9 TYPE 2 DIABETES MELLITUS WITHOUT COMPLICATIONS: ICD-10-CM

## 2022-06-24 NOTE — ED PROVIDER NOTE - CONDITION AT DISCHARGE:
Improved Dermal Autograft Text: The defect edges were debeveled with a #15 scalpel blade.  Given the location of the defect, shape of the defect and the proximity to free margins a dermal autograft was deemed most appropriate.  Using a sterile surgical marker, the primary defect shape was transferred to the donor site. The area thus outlined was incised deep to adipose tissue with a #15 scalpel blade.  The harvested graft was then trimmed of adipose and epidermal tissue until only dermis was left.  The skin graft was then placed in the primary defect and oriented appropriately.

## 2022-08-06 ENCOUNTER — EMERGENCY (EMERGENCY)
Facility: HOSPITAL | Age: 22
LOS: 1 days | Discharge: HOME | End: 2022-08-06
Admitting: EMERGENCY MEDICINE

## 2022-08-06 VITALS
SYSTOLIC BLOOD PRESSURE: 105 MMHG | TEMPERATURE: 98 F | HEART RATE: 89 BPM | OXYGEN SATURATION: 97 % | RESPIRATION RATE: 18 BRPM | DIASTOLIC BLOOD PRESSURE: 72 MMHG

## 2022-08-06 VITALS
WEIGHT: 173.06 LBS | HEIGHT: 62 IN | DIASTOLIC BLOOD PRESSURE: 79 MMHG | TEMPERATURE: 98 F | RESPIRATION RATE: 16 BRPM | SYSTOLIC BLOOD PRESSURE: 113 MMHG | HEART RATE: 86 BPM | OXYGEN SATURATION: 99 %

## 2022-08-06 LAB
APPEARANCE UR: CLEAR — SIGNIFICANT CHANGE UP
BILIRUB UR-MCNC: NEGATIVE — SIGNIFICANT CHANGE UP
C TRACH RRNA SPEC QL NAA+PROBE: SIGNIFICANT CHANGE UP
COLOR SPEC: YELLOW — SIGNIFICANT CHANGE UP
DIFF PNL FLD: NEGATIVE — SIGNIFICANT CHANGE UP
FLUAV H1 2009 PAND RNA SPEC QL NAA+PROBE: SIGNIFICANT CHANGE UP
FLUAV H1 RNA SPEC QL NAA+PROBE: SIGNIFICANT CHANGE UP
FLUAV H3 RNA SPEC QL NAA+PROBE: SIGNIFICANT CHANGE UP
FLUAV SUBTYP SPEC NAA+PROBE: SIGNIFICANT CHANGE UP
FLUBV RNA SPEC QL NAA+PROBE: SIGNIFICANT CHANGE UP
GLUCOSE UR QL: 500
KETONES UR-MCNC: NEGATIVE — SIGNIFICANT CHANGE UP
LEUKOCYTE ESTERASE UR-ACNC: NEGATIVE — SIGNIFICANT CHANGE UP
N GONORRHOEA RRNA SPEC QL NAA+PROBE: SIGNIFICANT CHANGE UP
NITRITE UR-MCNC: NEGATIVE — SIGNIFICANT CHANGE UP
PH UR: 6.5 — SIGNIFICANT CHANGE UP (ref 5–8)
PROT UR-MCNC: NEGATIVE MG/DL — SIGNIFICANT CHANGE UP
RAPID RVP RESULT: SIGNIFICANT CHANGE UP
S PYO AG SPEC QL IA: NEGATIVE — SIGNIFICANT CHANGE UP
SARS-COV-2 RNA SPEC QL NAA+PROBE: SIGNIFICANT CHANGE UP
SARS-COV-2 RNA SPEC QL NAA+PROBE: SIGNIFICANT CHANGE UP
SP GR SPEC: 1.02 — SIGNIFICANT CHANGE UP (ref 1–1.03)
SPECIMEN SOURCE: SIGNIFICANT CHANGE UP
UROBILINOGEN FLD QL: 0.2 E.U./DL — SIGNIFICANT CHANGE UP

## 2022-08-06 PROCEDURE — 99283 EMERGENCY DEPT VISIT LOW MDM: CPT

## 2022-08-06 RX ORDER — BENZOCAINE 10 %
1 GEL (GRAM) MUCOUS MEMBRANE
Qty: 1 | Refills: 0
Start: 2022-08-06 | End: 2022-08-08

## 2022-08-06 RX ORDER — FLUCONAZOLE 150 MG/1
150 TABLET ORAL ONCE
Refills: 0 | Status: COMPLETED | OUTPATIENT
Start: 2022-08-06 | End: 2022-08-06

## 2022-08-06 RX ORDER — IBUPROFEN 200 MG
600 TABLET ORAL ONCE
Refills: 0 | Status: COMPLETED | OUTPATIENT
Start: 2022-08-06 | End: 2022-08-06

## 2022-08-06 RX ADMIN — FLUCONAZOLE 150 MILLIGRAM(S): 150 TABLET ORAL at 02:42

## 2022-08-06 RX ADMIN — Medication 600 MILLIGRAM(S): at 01:09

## 2022-08-06 NOTE — ED PROVIDER NOTE - NSFOLLOWUPINSTRUCTIONS_ED_ALL_ED_FT
Viral Respiratory Infection    A viral respiratory infection is an illness that affects parts of the body used for breathing, like the lungs, nose, and throat. It is caused by a germ called a virus. Symptoms can include runny nose, coughing, sneezing, fatigue, body aches, sore throat, fever, or headache. Over the counter medicine can be used to manage the symptoms but the infection itself goes away on its own.     SEEK IMMEDIATE MEDICAL CARE IF YOU HAVE THE FOLLOWING SYMPTOMS: shortness of breath, chest pain, fever over 10 days, lightheadedness/dizziness.      Vaginal Yeast infection, Adult    Vaginal yeast infection is a condition that causes soreness, swelling, and redness (inflammation) of the vagina. It also causes vaginal discharge. This is a common condition. Some women get this infection frequently.     CAUSES  This condition is caused by a change in the normal balance of the yeast (candida) and bacteria that live in the vagina. This change causes an overgrowth of yeast, which causes the inflammation.    RISK FACTORS  This condition is more likely to develop in:    Women who take antibiotic medicines.  Women who have diabetes.  Women who take birth control pills.  Women who are pregnant.  Women who douche often.  Women who have a weak defense (immune) system.  Women who have been taking steroid medicines for a long time.  Women who frequently wear tight clothing.    SYMPTOMS  Symptoms of this condition include:    White, thick vaginal discharge.  Swelling, itching, redness, and irritation of the vagina. The lips of the vagina (vulva) may be affected as well.  Pain or a burning feeling while urinating.  Pain during sex.    DIAGNOSIS  This condition is diagnosed with a medical history and physical exam. This will include a pelvic exam. Your health care provider will examine a sample of your vaginal discharge under a microscope. Your health care provider may send this sample for testing to confirm the diagnosis.    TREATMENT  This condition is treated with medicine. Medicines may be over-the-counter or prescription. You may be told to use one or more of the following:    Medicine that is taken orally.  Medicine that is applied as a cream.  Medicine that is inserted directly into the vagina (suppository).    HOME CARE INSTRUCTIONS  Take or apply over-the-counter and prescription medicines only as told by your health care provider.  Do not have sex until your health care provider has approved. Tell your sex partner that you have a yeast infection. That person should go to his or her health care provider if he or she develops symptoms.  Do not wear tight clothes, such as pantyhose or tight pants.  Avoid using tampons until your health care provider approves.  Eat more yogurt. This may help to keep your yeast infection from returning.  Try taking a sitz bath to help with discomfort. This is a warm water bath that is taken while you are sitting down. The water should only come up to your hips and should cover your buttocks. Do this 3–4 times per day or as told by your health care provider.  Do not douche.  Wear breathable, cotton underwear.  If you have diabetes, keep your blood sugar levels under control.    SEEK MEDICAL CARE IF:  You have a fever.  Your symptoms go away and then return.  Your symptoms do not get better with treatment.  Your symptoms get worse.  You have new symptoms.  You develop blisters in or around your vagina.  You have blood coming from your vagina and it is not your menstrual period.  You develop pain in your abdomen.    ADDITIONAL NOTES AND INSTRUCTIONS    Please follow up with your Primary MD in 24-48 hr.  Seek immediate medical care for any new/worsening signs or symptoms.

## 2022-08-06 NOTE — ED ADULT TRIAGE NOTE - CHIEF COMPLAINT QUOTE
Walk in with c/o 3xdays sore throat w/nasal congestion and cough, in addition 1month persistent UTI like symptoms after completed PO abx course. Rapid covid negative PTA

## 2022-08-06 NOTE — ED PROVIDER NOTE - PATIENT PORTAL LINK FT
You can access the FollowMyHealth Patient Portal offered by [facility] by registering at the following website: [email or web address]. By joining [facility]’s FollowMyHealth portal, you will also be able to view your health information using other applications (apps) compatible with our system.

## 2022-08-06 NOTE — ED PROVIDER NOTE - CLINICAL SUMMARY MEDICAL DECISION MAKING FREE TEXT BOX
pt well appearing pw viral like illness with sore throat, rhinorrhea and occasional cough; additionally reports vaginal discharge does not want a gyn exam, does not want sti prophylaxis until results, by description results are consistent with candidiasis with cottage cheese discharge. Pt takes Semaglutide for DM likely the cause of DM. No abd pain, no rash. Plan: rapid strep, throat culture, ua, gc/chlamydia.

## 2022-08-06 NOTE — ED PROVIDER NOTE - OBJECTIVE STATEMENT
23 yo f pmhx sig for DM2 (on metformin) pw sore throat nasal discharge and occasional cough ongoing for 3 d in duration with no fevers, no chills. Additionally pt reports that she had frequency ongoing for 1 month in duration with vaginal discharge cottage cheese like similar to previous episodes of yeast infection, pt does not want a GYN exam and prefers to receive treatment in the ED. Pt takes Semaglutide for DM.    I have reviewed available current nursing and previous documentation of past medical, surgical, family, and/or social history.

## 2022-08-06 NOTE — ED PROVIDER NOTE - PHYSICAL EXAMINATION
Physical Exam    Vital Signs: I have reviewed the initial vital signs.  Constitutional: well-nourished, appears stated age, no acute distress  Eyes: PERRLA, EOM intact, RAPD absent, and symmetrical lids.  ENT: neck supple with no adenopathy, moist MM, no anterior adenopathy, no exudates, no tonsillar swelling or erythema   Cardiovascular: +S1/S2, no murmurs, regular rate, regular rhythm, well-perfused extremities  Respiratory: unlabored respiratory effort, clear to auscultation bilaterally, speaks in full sentences  Gastrointestinal: soft, non-tender abdomen, no cvat b/l

## 2022-08-06 NOTE — ED ADULT NURSE NOTE - OBJECTIVE STATEMENT
Walk in with c/o 3xdays worsening sore throat, now tonight with nasal congestion and mild cough. Pt took covid at home PTA with negative result. No resp difficulty, no difficulty swallowing. Pt also states treated 1xmonth PTA for UTI but symptoms of dysuria and urgency remain. No fevers.

## 2022-08-06 NOTE — ED PROVIDER NOTE - NS ED ROS FT
Review of Systems    Constitutional: (-) fever (-) weakness (-) diaphoresis   Eyes: (-) change in vision (-) pathophobia (-) eye pain  ENT: (-) ear ache (-) nasal discharge  Cardiovascular: (-) chest pain  (-) palpitations  Respiratory: (-) SOB (-) cough   GI: (-) abdominal pain (-) N/V (-) diarrhea  GYN: SEE HPI  Integumentary: (-) rash (-) redness

## 2022-08-07 LAB
CULTURE RESULTS: SIGNIFICANT CHANGE UP
SPECIMEN SOURCE: SIGNIFICANT CHANGE UP

## 2022-08-08 DIAGNOSIS — J02.9 ACUTE PHARYNGITIS, UNSPECIFIED: ICD-10-CM

## 2022-08-08 DIAGNOSIS — Z20.822 CONTACT WITH AND (SUSPECTED) EXPOSURE TO COVID-19: ICD-10-CM

## 2022-08-08 DIAGNOSIS — E11.9 TYPE 2 DIABETES MELLITUS WITHOUT COMPLICATIONS: ICD-10-CM

## 2022-08-08 DIAGNOSIS — J06.9 ACUTE UPPER RESPIRATORY INFECTION, UNSPECIFIED: ICD-10-CM

## 2022-08-08 DIAGNOSIS — Z79.4 LONG TERM (CURRENT) USE OF INSULIN: ICD-10-CM

## 2022-08-08 DIAGNOSIS — N89.8 OTHER SPECIFIED NONINFLAMMATORY DISORDERS OF VAGINA: ICD-10-CM

## 2022-11-30 NOTE — ED ADULT TRIAGE NOTE - CHIEF COMPLAINT QUOTE
Pt complaining of burning with urination and lower abdominal pain x 1 week. Pt denies hematuria. LMP 9/16 YO: 2/18/2023  28w4d  Per [place] office note: she recalls last pregnancy not eating for 2 days after doing the glucose test. B/c she has trouble gaining wt, she chose to avoid potential of not eating and to SMBG to r/t GDM.    Trends: limited post meal values.         Date    FBS After  Brkfst After  Lunch After  Supper   11.19.22 75 95 113 111   11.20.22 80 90 130 95   11.21.22 80 99 Not feeling well     11.22.22 77 94 Not feeling well     11.23.22 77 98 81     11.24.22 74 94   97   11.25.22 76   97 109   11.26.22 77 112 108     11.27.22 80 101 110 110   11.28.22 79 89 119 107   11.29.22 79 Dr abby Not feeling well     11.30.22 Dr abby, forgot 94

## 2023-01-04 NOTE — ED ADULT TRIAGE NOTE - BRAND OF FIRST COVID-19 BOOSTER
DATE: 1/4/2023  PATIENT: Rosita Badillo  Referred By: Diane Machado MD        Cancer Staging  Malignant neoplasm of lower-inner quadrant of left breast in female, estrogen receptor positive (CMS/HCC)  Staging form: Breast, AJCC 8th Edition  - Clinical: Stage IA (cT1c, cN0, cM0, G2, ER+, OH+, HER2-) - Signed by Estela Aguilera MD on 9/9/2022  - Pathologic stage from 10/20/2022: pT1b, pN0(sn), cM0 - Signed by Landon Prescott MD on 10/20/2022    Cancer Genomics:   Genetics Testing: Has been ordered but has not yet completed  Goal of Treatment: Curative        CHIEF COMPLAINT::  Rosita Badillo is a 61 year old female whom I am seeing at the kind request of Diane Machado MD for further evaluation and management of left breast cancer.    Tolerating exemestane well.  Hematology/Oncology Summary:  Screen detected left breast abnormality, 1.1 cm, left breast 6 o'clock position 9 cm from the nipple.    August 29, 2022 biopsy showed ILC, grade 2, , , HER2 negative, Ki-67 2 to 5%.  Breast RT completed.  Now on exemestane beginning December 2022.    HISTORY OF PRESENT ILLNESS:  Tolerating exemestane well.  Minimal anxiety but no other problems.  Patient denies fever, chills, drenching night sweats, weight loss, anorexia, fatigue, dyspnea.    Patient's medications, allergies, past medical, surgical, social and family histories were reviewed and updated as appropriate.    PAST MEDICAL HISTORY:     Past Medical History:   Diagnosis Date   • Allergic rhinitis    • Anesthesia     no reaction   • Anxiety    • At risk for sleep apnea    • Atrial fibrillation (CMS/HCC)     only with stress / sees cardiologist - last seen 3 yrs ago / ONSET 2000 due to 'stress'  / second episode in 2009 /   • Diverticulosis    • DVT (deep venous thrombosis) (CMS/HCC) 2018    RIGHT LEG   • Dyspnea on exertion    • Gastroesophageal reflux disease    • Hypercholesterolemia    • Hypertension    • Malignant neoplasm (CMS/HCC) 08/29/2022     left breast   • Migraines        PAST SURGICAL HISTORY:     Past Surgical History:   Procedure Laterality Date   • Colonoscopy      approx 10 yrs ago / along with EGD   • Esophagogastroduodenoscopy transoral flex diag     • Leg/ankle surgery proc unlisted Right 2018    fractured ankle repair   • Tonsillectomy      childhood       FAMILY MEDICAL HISTORY:     Family History   Problem Relation Age of Onset   • Hypertension Mother    • Cancer, Skin Mother    • Alcohol Abuse Father    • Cancer, Skin Sister    • Cancer, Breast Paternal Aunt    • Hypertension Maternal Grandmother    • Cancer Maternal Cousin         ovarian CA   • Cancer, Breast Maternal Cousin    • Cancer Maternal Cousin         ovarian CA       SOCIAL HISTORY:   Social History     Tobacco Use   • Smoking status: Never   • Smokeless tobacco: Never   Vaping Use   • Vaping Use: never used   Substance Use Topics   • Alcohol use: Never   • Drug use: Never         MEDICATIONS:     Current Outpatient Medications   Medication Sig   • famotidine (PEPCID) 40 MG tablet TAKE 1 TABLET BY MOUTH DAILY. DO NOT TAKE PANTOPRAZOLE   • exemestane (AROMASIN) 25 MG tablet Take 1 tablet by mouth daily. 0   • ALPRAZolam (XANAX) 0.25 MG tablet    • pantoprazole (PROTONIX) 40 MG tablet Take 40 mg by mouth as needed.   • metoPROLOL tartrate (LOPRESSOR) 25 MG tablet Take 25 mg by mouth every 12 hours.     No current facility-administered medications for this visit.       ALLERGIES:    ALLERGIES:   Allergen Reactions   • Shellfish Allergy   (Food Or Med) HIVES and RASH   • Iodine SWELLING     Part of shellfish   • Azithromycin Palpitations   • Sulfa Antibiotics Palpitations           REVIEW OF SYSTEMS:    Psychosocial assessment was obtained. Intervention was not necessary.    ECOG Performance Status: 0 - Fully active, able to carry on all pre-disease activities without restrictions.    Pain Scale: None    Treatment Related Toxicites: None    Review of Systems   Constitutional:  Negative for appetite change, chills, fever and unexpected weight change.   HENT: Negative for facial swelling, mouth sores, nosebleeds, sinus pain, trouble swallowing and voice change.    Eyes: Negative for visual disturbance.   Respiratory: Negative for chest tightness and shortness of breath.    Gastrointestinal: Negative for anal bleeding, constipation, diarrhea, nausea, rectal pain and vomiting.   Endocrine: Negative for cold intolerance and heat intolerance.   Genitourinary: Negative for difficulty urinating, dysuria, flank pain and hematuria.   Musculoskeletal: Negative for arthralgias, back pain, joint swelling and neck pain.   Skin: Negative for color change and rash.   Neurological: Negative for dizziness, speech difficulty, weakness, light-headedness and headaches.   Hematological: Negative for adenopathy. Does not bruise/bleed easily.   Psychiatric/Behavioral: Negative for dysphoric mood, self-injury, sleep disturbance and suicidal ideas. The patient is not nervous/anxious.            Vitals:  Visit Vitals  BP (!) 149/83 (BP Location: RUE - Right upper extremity, Patient Position: Sitting, Cuff Size: Large Adult)   Pulse 77   Temp 97.6 °F (36.4 °C) (Oral)   Resp 20   Ht 5' 4\" (1.626 m)   Wt 103.9 kg (229 lb 0.9 oz)   LMP  (LMP Unknown)   SpO2 96%   BMI 39.32 kg/m²     Body Surface Area: Body surface area is 2.07 meters squared.    Physical Exam:  Physical Exam  Constitutional:       General: She is not in acute distress.     Appearance: She is well-developed.   HENT:      Head: Normocephalic and atraumatic.      Mouth/Throat:      Pharynx: No oropharyngeal exudate.      Neck: Normal range of motion and neck supple.   Eyes:      General: No scleral icterus.        Right eye: No discharge.         Left eye: No discharge.      Conjunctiva/sclera: Conjunctivae normal.   Neck:      Thyroid: No thyromegaly.   Pulmonary:      Effort: Pulmonary effort is normal. No respiratory distress.      Breath sounds:  Normal breath sounds. No wheezing or rales.   Chest:      Chest wall: No tenderness.   Breasts:     Right: No mass.      Left: No mass.      Comments: Infra mammary scar under left breast. Well healed.  No post RT changes.  Abdominal:      General: Bowel sounds are normal. There is no distension.      Palpations: Abdomen is soft. There is no mass.      Tenderness: There is no abdominal tenderness. There is no guarding or rebound.   Musculoskeletal:      Right lower leg: No edema.      Left lower leg: No edema.   Lymphadenopathy:      Cervical: No cervical adenopathy.      Upper Body:      Right upper body: No axillary adenopathy.      Left upper body: No axillary adenopathy.   Skin:     General: Skin is warm and dry.      Coloration: Skin is not pale.      Findings: No erythema or rash.   Neurological:      Mental Status: She is alert and oriented to person, place, and time.      Coordination: Coordination normal.   Psychiatric:         Behavior: Behavior normal.         Thought Content: Thought content normal.         Judgment: Judgment normal.         LABS:    WBC (K/mcL)   Date Value   09/17/2022 7.5   06/29/2022 6.9   11/03/2021 8.8     HCT (%)   Date Value   09/17/2022 47.4 (H)   06/29/2022 48.3 (H)   11/03/2021 46.0     HGB (g/dL)   Date Value   09/17/2022 15.4   06/29/2022 16.1 (H)   11/03/2021 15.2       PLT (K/mcL)   Date Value   09/17/2022 282   06/29/2022 269   11/03/2021 271     Glucose (mg/dL)   Date Value   09/17/2022 92     Sodium (mmol/L)   Date Value   09/17/2022 140     Potassium (mmol/L)   Date Value   09/17/2022 4.1     Chloride (mmol/L)   Date Value   09/17/2022 108     Carbon Dioxide (mmol/L)   Date Value   09/17/2022 26     BUN (mg/dL)   Date Value   09/17/2022 15     Creatinine (mg/dL)   Date Value   09/17/2022 0.90     Protein, Total (g/dL)   Date Value   09/17/2022 7.0     Albumin (g/dL)   Date Value   09/17/2022 3.5 (L)     Calcium (mg/dL)   Date Value   09/17/2022 9.6     Bilirubin, Total  (mg/dL)   Date Value   09/17/2022 0.6     GOT/AST (Units/L)   Date Value   09/17/2022 16     Alkaline Phosphatase (Units/L)   Date Value   09/17/2022 72     GPT/ALT (Units/L)   Date Value   09/17/2022 23     Anion Gap (mmol/L)   Date Value   09/17/2022 10     BUN/ Creatinine Ratio (no units)   Date Value   09/17/2022 17     Globulin (g/dL)   Date Value   09/17/2022 3.5     A/G Ratio (no units)   Date Value   09/17/2022 1.0     GFR Estimate, Non  (no units)   Date Value   11/02/2019 66   11/02/2019     eGFR 60 - 89 mL/min/1.73m2 = Mild decrease in kidney function.     GFR Estimate,  (no units)   Date Value   11/02/2019     eGFR 60 - 89 mL/min/1.73m2 = Mild decrease in kidney function.   11/02/2019 76        PATHOLOGY:  Pathologic Diagnosis   Left breast, 5:00, 8 cm from nipple; core biopsy:   -Invasive lobular carcinoma, grade 2, involving 4 out of 4 cores, measuring 1 cm in the maximum contiguous extent     Estrogen Receptor Percent Positive: 100%, strong  Progesterone Receptor Percent Positive: 100%, moderate to strong  Percent of cells positive for Ki-67: 2-5%   HER2/eligio analysis by immunohistochemistry  IHC HER2/eligio Score: 0 (negative)      IMAGING:  XR CHEST PA AND LATERAL 2 VIEWS    Result Date: 9/7/2022  Narrative: EXAM: XR CHEST PA AND LATERAL 2 VIEWS CLINICAL INDICATION: PRE-OP BREAST. NCC, NON-SMOKER. A-FIB W/STRESS COMPARISON: 10/31/2019. FINDINGS:  PA and lateral chest radiographs were obtained. Cardiomediastinal and hilar contours are within normal limits.  No focal lung consolidation is seen.  There is no pleural effusion, or pneumothorax.  There is no acute osseous abnormality.  Visualized portions of the upper abdomen are within normal limits.     Impression: No acute cardiopulmonary process. Electronically Signed by: VIBHA MARTIN M.D. Signed on: 9/7/2022 4:57 PM     MAMMO DIAGNOSTIC LEFT W VALE      Impression: MAMMOGRAPHY  SUSPICIOUS ABNORMALITY - MODERATE CONCERN  BUT NOT CLASSIC FOR MALIGNANCY, ULTRASOUND  SUSPICIOUS ABNORMALITY - MODERATE CONCERN BUT NOT CLASSIC FOR MALIGNANCY A 0.9 cm left breast mass at 5-6 o'clock mid depth, highly suspicious.   An ultrasound guided biopsy is recommended.  Dr. Tatum and Dr. Machado  were notified of the findings above by Dr. Aguilera using PerfectServe8/22/2022 3:52 PM. I also discussed these findings with the patient on the day of the exam. OVERALL STUDY BIRADS: 4c Suspicious of malignancy Electronically Signed by: Brook Aguilera M.D.          ms/:8/22/2022 15:58:48  Imaging Technologist: Stacy Kebede, RDMS, [facility]; Bianca Ventura, (R)(M), [facility] letter sent: Patient Handed Results      ASSESSMENT/PLAN:      1. Malignant neoplasm of lower-inner quadrant of left breast in female, estrogen receptor positive (CMS/HCC)   Stage I breast cancer, hormone positive.   1.2 cm primary tumor and nodes negative.   Given the strong ER/MA and low Ki-67,  Oncotype 9, no systemic chemotherapy.   Breast RT has been completed.   Continue exemestane.   Has started vitamin D.  Encourage exercise.  To order bone density next visit.           ORDERS:  Orders Placed This Encounter   • famotidine (PEPCID) 40 MG tablet         FOLLOW UP:  Return in about 4 months (around 5/4/2023).        Learning needs assessed. Learning intervention was not required.    Above plan was discussed with patient and family and all pertinent questions were answered. At the end of the evaluation, the patient was asked if all complaints had been addressed today to her satisfaction and she responded affirmatively.      Thank you for allowing me to participate in your patient's healthcare management. Please feel free to contact me with any questions.    Sincerely,    Estela Aguilera MD   [employer]

## 2023-01-26 ENCOUNTER — EMERGENCY (EMERGENCY)
Facility: HOSPITAL | Age: 23
LOS: 1 days | Discharge: HOME | End: 2023-01-26
Admitting: EMERGENCY MEDICINE
Payer: MEDICAID

## 2023-01-26 VITALS
DIASTOLIC BLOOD PRESSURE: 85 MMHG | WEIGHT: 169.98 LBS | OXYGEN SATURATION: 99 % | RESPIRATION RATE: 16 BRPM | HEART RATE: 82 BPM | SYSTOLIC BLOOD PRESSURE: 121 MMHG | TEMPERATURE: 98 F | HEIGHT: 62 IN
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
## 2023-01-26 DIAGNOSIS — M54.50 LOW BACK PAIN, UNSPECIFIED: ICD-10-CM
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
## 2023-01-26 DIAGNOSIS — E11.9 TYPE 2 DIABETES MELLITUS WITHOUT COMPLICATIONS: ICD-10-CM

## 2023-01-26 DIAGNOSIS — S39.012A STRAIN OF MUSCLE, FASCIA AND TENDON OF LOWER BACK, INITIAL ENCOUNTER: ICD-10-CM

## 2023-01-26 PROCEDURE — 72100 X-RAY EXAM L-S SPINE 2/3 VWS: CPT | Mod: 26

## 2023-01-26 PROCEDURE — 99284 EMERGENCY DEPT VISIT MOD MDM: CPT

## 2023-01-26 RX ORDER — IBUPROFEN 200 MG
1 TABLET ORAL
Qty: 56 | Refills: 0
Start: 2023-01-26 | End: 2023-02-08

## 2023-01-26 RX ORDER — KETOROLAC TROMETHAMINE 30 MG/ML
30 SYRINGE (ML) INJECTION ONCE
Refills: 0 | Status: DISCONTINUED | OUTPATIENT
Start: 2023-01-26 | End: 2023-01-26

## 2023-01-26 RX ORDER — DIAZEPAM 5 MG
5 TABLET ORAL ONCE
Refills: 0 | Status: DISCONTINUED | OUTPATIENT
Start: 2023-01-26 | End: 2023-01-26

## 2023-01-26 RX ORDER — METHOCARBAMOL 500 MG/1
2 TABLET, FILM COATED ORAL
Qty: 40 | Refills: 0
Start: 2023-01-26 | End: 2023-01-30

## 2023-01-26 RX ORDER — LIDOCAINE 4 G/100G
1 CREAM TOPICAL ONCE
Refills: 0 | Status: COMPLETED | OUTPATIENT
Start: 2023-01-26 | End: 2023-01-26

## 2023-01-26 RX ADMIN — Medication 5 MILLIGRAM(S): at 18:55

## 2023-01-26 RX ADMIN — LIDOCAINE 1 PATCH: 4 CREAM TOPICAL at 18:56

## 2023-01-26 RX ADMIN — Medication 30 MILLIGRAM(S): at 18:56

## 2023-01-26 NOTE — ED PROVIDER NOTE - PATIENT PORTAL LINK FT
You can access the FollowMyHealth Patient Portal offered by [facility] by registering at the following website: [email or web address]. By joining [employer]’s FollowMyHealth portal, you will also be able to view your health information using other applications (apps) compatible with our system.

## 2023-01-26 NOTE — ED PROVIDER NOTE - OBJECTIVE STATEMENT
23yo otherwise healthy F presents with c/o central low back pain after lifting a heavy box yesterday. she also notes a similar episode last week as she has been moving things into her home. she denies numbness, tingling, weakness, bowel or bladder incontinence or retention, no difficulty walking but notes it increases her pain. took two ibuprofen this morning and 2 tylenol 1 hour pta.

## 2023-01-26 NOTE — ED PROVIDER NOTE - NSFOLLOWUPINSTRUCTIONS_ED_ALL_ED_FT
[facility]  [phone]      Back Pain    Back pain is very common in adults. The cause of back pain is rarely dangerous and the pain often gets better over time. The cause of your back pain may not be known and may include strain of muscles or ligaments, degeneration of the spinal disks, or arthritis. Occasionally the pain may radiate down your leg(s). Over-the-counter medicines to reduce pain and inflammation are often the most helpful. Stretching and remaining active frequently helps the healing process.     SEEK IMMEDIATE MEDICAL CARE IF YOU HAVE THE FOLLOWING SYMPTOMS: bowel or bladder control problems, unusual weakness or numbness in your arms or legs, nausea or vomiting, abdominal pain, fever, dizziness/lightheadedness.

## 2023-01-26 NOTE — ED PROVIDER NOTE - CLINICAL SUMMARY MEDICAL DECISION MAKING FREE TEXT BOX
pt presents with c/o lower back pain after lifting. denies any possibility of pregnancy. denies any red flag symptoms of back pain. took ibuprofen this morning without relief and tylenol just pta. will give lidocaine patch, toradol, and valium. no improvement initially so subsequently given percocet. signed out to night team.

## 2023-01-26 NOTE — ED ADULT NURSE NOTE - OBJECTIVE STATEMENT
Patient presents to ED c/o lower back pain x yesterday s/p trying to lift a heavy object. Patient endorses severe pain. Denies numbness/tingling. Patient has equal bilateral strength in extremities. Denies cp, sob, cough. Pt aox4, ambulatory with steady gait, spont unlabored breathing on ra.

## 2023-01-26 NOTE — ED PROVIDER NOTE - MUSCULOSKELETAL, MLM
Spine appears normal, + midline tenderness to lower L spine region without crepitus or stepoff deformity, range of motion is not limited, no muscle or joint tenderness

## 2023-01-26 NOTE — ED PROVIDER NOTE - NEUROLOGICAL, MLM
Alert and oriented, no focal deficits, ambulating with steady gait, 5/5 sensation and strength equal to bilat lower extremities, normal tip toe walking, no genital numbness on self exam.

## 2023-01-26 NOTE — ED ADULT NURSE NOTE - PRO INTERPRETER NEED 2
Nursing notes reviewed and accepted.    Sera Mcclendon is a 14 year old female who presents for  well child exam.  Patient presents unaccompanied.    Concerns raised today include: left shoulder pain - started about 1 month ago - she has loud snapping sound from the scapula with most motion.  Moderate pain intermittently.  She is swimming competitively and has not noted any increased pain with it.  No previous injury.  She has not been strengthening or stretching more than usual.    Social History:   School: [employer]  Hobbies / Activities: swimming  Future Plans: college  Tobacco: denies  Alcohol: denies  Illicit drugs or Homeopathic medicines: denies  Family History: negative for athletic cardiac events      REVIEW OF SYSTEMS:  General: Feeling well, no fever/chills, recent rapid weight changes  Dermatologic: No new or changes in existing moles or lesions. No rashes.  Neurologic: No dizziness, no headachess, numbness/tingling  Respiratory: No upper respiratory infection symptoms, no cough, wheeze, shortness of breath, difficulty in breathing.  Nodes: No noted lymphadenopathy, swollen glands  Cardiac: No chest pain, palpitations, skipped beats. No prior history of murmur. No history of syncope.  Gastrointestinal: No emesis, diarrhea, constipation, or blood in stools  Genitourinary: No polyuria or dysuria, urgency, or frequency  Menstrual: Last menstrual period regular. No dysmenorrhea.  Musculoskeletal: No joint swelling/warmth, no recent injuries.  Hematologic: No easy bruising or bleeding  Psychiatric: No depressive symptoms    PHYSICAL EXAMINATION:  Blood pressure 100/68, pulse 78, height 5' 6.25\" (1.683 m), weight 53.6 kg, last menstrual period 04/21/2020.  General: Well appearing female, no acute distress  Dermatologic: No lesions or rashes noted  HEENT: Pupils equal, round, reactive to light; extraocular movements intact, no conjunctival injection. No scleral icterus. Tympanic membrane with normal  landmarks bilaterally.  Oropharynx with moist mucous membranes, clear.  Neck: supple  Nodes: no adenopathy  Breast: deferred today.  Lungs: Clear to auscultation. No wheezes, rales, rhonchi. Normal work of breathing.  Cardiac: Regular rate and rhythm, normal S1, S2, no murmur appreciated.  Abdomen: Soft, nontender, nondistended. Normoactive bowel sounds. No organomegaly or masses.  Genital: deferred today.  Extremities: Full range of motion upper and lower extremities. Warm, well perfused. No clubbing/cyanosis/edema  Left Shoulder:  Normal range of motion.  Loud snapping sound from superior scapular area with very tight muscles surrounding it.  No tenderness to palpation.  Back: No scoliosis  Neurologic: Grossly intact. Strength 5/5 bilaterally. Normal tone. Gait nonantalgic, toe/heelwalking intact.    ASSESSMENT: Well 14 year old female adolescent.  Sera was seen today for well child, shoulder pain and knee pain.    Diagnoses and all orders for this visit:    Encounter for routine child health examination without abnormal findings    Snapping scapula syndrome of left shoulder        BEHAVIOR/GUIDANCE:      Tobacco, alcohol, drug avoidance - DISCUSSED      Sexual activity & avoidance / safe sex - DISCUSSED      Puberty / menstruation - DISCUSSED      Accident prevention - DISCUSSED      School achievement - DISCUSSED      Family/Peer relationships - DISCUSSED      Regular physical activity - DISCUSSED      Healthy food choices - DISCUSSED    PLAN:  Anticipatory guidance sheet   [employer] participation okay  Immunizations: up to date   Labs:  none  Return visit in 1-2 years for well child examination or sooner if needed with illness/concerns.  The patient will see her  at school for evaluation of her snapping shoulder and notify the office if it does not resolve or worsens.             English

## 2023-01-26 NOTE — ED PROVIDER NOTE - CONSTITUTIONAL, MLM
normal... Well appearing, awake, alert, oriented to person, place, time/situation, appears somewhat uncomfortable.

## 2023-02-20 NOTE — ED ADULT NURSE NOTE - NSSUHOSCREENINGYN_ED_ALL_ED
Hospitalist Progress Note      PCP: No primary care provider on file. Date of Admission: 2/17/2023    Chief Complaint: fever, abdominal pain. Hospital Course:    51 y.o. female with PMHx of Anxiety, COPD, HTN and substance abuse presented to [facility] in transfer from [facility] for management of sepsis with pyelonephritis. Pt presented to [place] ED with fever and lower abdominal pain present for several day.  + nausea and vomiting yesterday. No urinary symptoms. + Suprapubic abdominal pain    Subjective:   Patient reported improved pain. Last spike of fever yesterday at 1500  Patient denies any new complaints    Medications:  Reviewed    Infusion Medications    sodium chloride      sodium chloride 150 mL/hr at 02/20/23 0531     Scheduled Medications    enoxaparin  40 mg SubCUTAneous Daily    scopolamine  1 patch TransDERmal Q72H    sodium chloride flush  5-40 mL IntraVENous 2 times per day    cefepime  1,000 mg IntraVENous Q12H    nicotine  1 patch TransDERmal Daily     PRN Meds: calcium carbonate, hydrALAZINE, oxyCODONE, morphine, ondansetron, sodium chloride flush, sodium chloride, acetaminophen **OR** acetaminophen      Intake/Output Summary (Last 24 hours) at 2/20/2023 1220  Last data filed at 2/19/2023 1743  Gross per 24 hour   Intake 360 ml   Output --   Net 360 ml         Physical Exam Performed:    BP (!) 149/96   Pulse 94   Temp 98.3 °F (36.8 °C) (Oral)   Resp 16   Ht 5' 4\" (1.626 m)   Wt 140 lb 3.4 oz (63.6 kg)   SpO2 100%   BMI 24.07 kg/m²     General appearance: No apparent distress, appears stated age and cooperative. HEENT: Pupils equal, round, and reactive to light. Conjunctivae/corneas clear. Neck: Supple, with full range of motion. No jugular venous distention. Trachea midline. Respiratory:  Normal respiratory effort. Clear to auscultation, bilaterally without Rales/Wheezes/Rhonchi.   Cardiovascular: Regular rate and rhythm with normal S1/S2 without murmurs, rubs or gallops. Abdomen: Soft, non-tender, non-distended with normal bowel sounds. Musculoskeletal: No clubbing, cyanosis or edema bilaterally. Full range of motion without deformity. Skin: Skin color, texture, turgor normal.  No rashes or lesions. Neurologic:  Neurovascularly intact without any focal sensory/motor deficits. Cranial nerves: II-XII intact, grossly non-focal.  Psychiatric: Alert and oriented, thought content appropriate, normal insight  Capillary Refill: Brisk, 3 seconds, normal   Peripheral Pulses: +2 palpable, equal bilaterally       Labs:   Recent Labs     02/18/23  0523 02/19/23  0535 02/20/23  0730   WBC 13.7* 12.8* 12.0*   HGB 10.9* 10.1* 10.9*   HCT 33.9* 29.9* 33.0*    144 160       Recent Labs     02/18/23  0523 02/19/23  0535 02/20/23  0730   * 133* 137   K 4.2 3.6 3.8    105 107   CO2 17* 16* 17*   BUN 31* 20 17   CREATININE 1.9* 1.5* 1.5*   CALCIUM 7.3* 7.3* 8.0*       Recent Labs     02/17/23  1440   AST 18   ALT 12   BILITOT 0.3   ALKPHOS 115       No results for input(s): INR in the last 72 hours. Recent Labs     02/17/23  1440   TROPONINI <0.01         Urinalysis:      Lab Results   Component Value Date/Time    NITRU POSITIVE 02/17/2023 03:00 PM    WBCUA  02/17/2023 03:00 PM    BACTERIA 2+ 02/17/2023 03:00 PM    RBCUA  02/17/2023 03:00 PM    BLOODU LARGE 02/17/2023 03:00 PM    SPECGRAV 1.020 02/17/2023 03:00 PM    GLUCOSEU Negative 02/17/2023 03:00 PM       Radiology:  CT ABDOMEN PELVIS WO CONTRAST Additional Contrast? None   Final Result   1. Left renal cortical thinning and adjacent perinephric fat stranding with   no hydronephrosis or evident renal stone. The findings are suspicious for   pyelonephritis. Correlation with urinalysis is suggested. 2. No acute findings elsewhere in the abdomen or pelvis. 3. Age advanced atherosclerosis of the abdominal aorta and its branches. 4. Hepatomegaly.    5. Grade 1 anterolisthesis of L5 on S1 related to bilateral L5 spondylolysis   and advanced degenerative disc disease at L5-S1. XR CHEST PORTABLE   Final Result   No radiographic evidence of an acute cardiopulmonary process. None    Assessment/Plan:    Active Hospital Problems    Diagnosis     Sepsis ([facility] [address] [A41.9]      Priority: Medium    Pyelonephritis [N12]      Priority: Medium    Acute kidney injury ([address] [N17.9]      Priority: Medium     Sepsis ([facility] [address] on admission due to Acute Pyelonephritis  - with Tachycardia,  Neutrophilic Leukocytosis and infection  -  Initial Lactic Acid 2.4 > 1.7 and will trend   - Pt was resuscitated with 30 cc/Kg IV Fluids and blood pressure will be monitored closely  - continue IV fluids  - No previous culture available to review  - Ct abd/pelv: Left renal cortical thinning and adjacent perinephric fat stranding with no hydronephrosis or evident renal stone. The findings are suspicious for pyelonephritis. Plan   - Urine and blood cultures with sensitive E. Coli  -Continue on cefepime for today given spikes of fever  - Hydrate with IVF     Acute kidney injury -   the etiology is unclear but considerations include dehydration, hypotension, nephrotoxic medications, and ATN  - crt baseline 0.8,  today 2.5--> 1.9--> 1.5  - IVF  - Avoid nephrotoxins  - check: UACS, Sander/UCrt, uric acid, TSH, U osmol  - follow renal function tests; if no improvement will get renal US     Uncontrolled pain. Continue prn oxycodone  ( dose increased yesterday with good effect)     COPD (chronic obstructive pulmonary disease)   - without acute exacerbation.   - Nebulizer treatments as needed and continue home medication  - Patient will be monitored closely, and deep breathing and coughing will be encouraged while awake. HTN  - monitor blood pressure  - currently hypotensive will hold home meds     Anxiety  - supportive therapy  - resume       DVT Prophylaxis: lovenox  Diet: ADULT DIET;  Regular  Code Status: Full Code  PT/OT Eval Status: baseline .      Dispo -possible discharge in the next 1 to 2 days if patient is afebrile    Debra Tejada MD Yes - the patient is able to be screened

## 2023-04-17 ENCOUNTER — EMERGENCY (EMERGENCY)
Facility: HOSPITAL | Age: 23
LOS: 1 days | Discharge: HOME | End: 2023-04-17
Attending: EMERGENCY MEDICINE | Admitting: EMERGENCY MEDICINE
Payer: MEDICAID

## 2023-04-17 VITALS
DIASTOLIC BLOOD PRESSURE: 82 MMHG | HEART RATE: 99 BPM | SYSTOLIC BLOOD PRESSURE: 117 MMHG | WEIGHT: 169.98 LBS | TEMPERATURE: 98 F | OXYGEN SATURATION: 99 % | RESPIRATION RATE: 18 BRPM | HEIGHT: 62 IN

## 2023-04-17 VITALS
OXYGEN SATURATION: 98 % | SYSTOLIC BLOOD PRESSURE: 114 MMHG | DIASTOLIC BLOOD PRESSURE: 75 MMHG | HEART RATE: 91 BPM | RESPIRATION RATE: 18 BRPM | TEMPERATURE: 98 F

## 2023-04-17 DIAGNOSIS — R19.5 OTHER FECAL ABNORMALITIES: ICD-10-CM

## 2023-04-17 DIAGNOSIS — E11.9 TYPE 2 DIABETES MELLITUS WITHOUT COMPLICATIONS: ICD-10-CM
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
## 2023-04-17 DIAGNOSIS — Z79.84 LONG TERM (CURRENT) USE OF ORAL HYPOGLYCEMIC DRUGS: ICD-10-CM

## 2023-04-17 DIAGNOSIS — T50.995A ADVERSE EFFECT OF OTHER DRUGS, MEDICAMENTS AND BIOLOGICAL SUBSTANCES, INITIAL ENCOUNTER: ICD-10-CM

## 2023-04-17 DIAGNOSIS — R11.0 NAUSEA: ICD-10-CM

## 2023-04-17 DIAGNOSIS — R19.7 DIARRHEA, UNSPECIFIED: ICD-10-CM

## 2023-04-17 DIAGNOSIS — H02.844 EDEMA OF LEFT UPPER EYELID: ICD-10-CM

## 2023-04-17 DIAGNOSIS — D72.829 ELEVATED WHITE BLOOD CELL COUNT, UNSPECIFIED: ICD-10-CM
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
## 2023-04-17 DIAGNOSIS — R22.32 LOCALIZED SWELLING, MASS AND LUMP, LEFT UPPER LIMB: ICD-10-CM

## 2023-04-17 LAB
ALBUMIN SERPL ELPH-MCNC: 4.1 G/DL — SIGNIFICANT CHANGE UP (ref 3.4–5)
ALP SERPL-CCNC: 92 U/L — SIGNIFICANT CHANGE UP (ref 40–120)
ALT FLD-CCNC: 18 U/L — SIGNIFICANT CHANGE UP (ref 12–42)
ANION GAP SERPL CALC-SCNC: 10 MMOL/L — SIGNIFICANT CHANGE UP (ref 9–16)
APPEARANCE UR: CLEAR — SIGNIFICANT CHANGE UP
AST SERPL-CCNC: 20 U/L — SIGNIFICANT CHANGE UP (ref 15–37)
BASOPHILS # BLD AUTO: 0.06 K/UL — SIGNIFICANT CHANGE UP (ref 0–0.2)
BASOPHILS NFR BLD AUTO: 0.5 % — SIGNIFICANT CHANGE UP (ref 0–2)
BILIRUB SERPL-MCNC: 0.5 MG/DL — SIGNIFICANT CHANGE UP (ref 0.2–1.2)
BILIRUB UR-MCNC: NEGATIVE — SIGNIFICANT CHANGE UP
BUN SERPL-MCNC: 15 MG/DL — SIGNIFICANT CHANGE UP (ref 7–23)
CALCIUM SERPL-MCNC: 9.6 MG/DL — SIGNIFICANT CHANGE UP (ref 8.5–10.5)
CHLORIDE SERPL-SCNC: 105 MMOL/L — SIGNIFICANT CHANGE UP (ref 96–108)
CO2 SERPL-SCNC: 21 MMOL/L — LOW (ref 22–31)
COLOR SPEC: YELLOW — SIGNIFICANT CHANGE UP
CREAT SERPL-MCNC: 0.89 MG/DL — SIGNIFICANT CHANGE UP (ref 0.5–1.3)
DIFF PNL FLD: NEGATIVE — SIGNIFICANT CHANGE UP
EGFR: 93 ML/MIN/1.73M2 — SIGNIFICANT CHANGE UP
EOSINOPHIL # BLD AUTO: 0.07 K/UL — SIGNIFICANT CHANGE UP (ref 0–0.5)
EOSINOPHIL NFR BLD AUTO: 0.6 % — SIGNIFICANT CHANGE UP (ref 0–6)
GLUCOSE SERPL-MCNC: 159 MG/DL — HIGH (ref 70–99)
GLUCOSE UR QL: >=1000
HCG UR QL: NEGATIVE — SIGNIFICANT CHANGE UP
HCT VFR BLD CALC: 51.4 % — HIGH (ref 34.5–45)
HGB BLD-MCNC: 16.2 G/DL — HIGH (ref 11.5–15.5)
IMM GRANULOCYTES NFR BLD AUTO: 0.4 % — SIGNIFICANT CHANGE UP (ref 0–0.9)
KETONES UR-MCNC: NEGATIVE — SIGNIFICANT CHANGE UP
LEUKOCYTE ESTERASE UR-ACNC: NEGATIVE — SIGNIFICANT CHANGE UP
LIDOCAIN IGE QN: 77 U/L — SIGNIFICANT CHANGE UP (ref 73–393)
LYMPHOCYTES # BLD AUTO: 2.77 K/UL — SIGNIFICANT CHANGE UP (ref 1–3.3)
LYMPHOCYTES # BLD AUTO: 24.1 % — SIGNIFICANT CHANGE UP (ref 13–44)
MCHC RBC-ENTMCNC: 26.3 PG — LOW (ref 27–34)
MCHC RBC-ENTMCNC: 31.5 GM/DL — LOW (ref 32–36)
MCV RBC AUTO: 83.4 FL — SIGNIFICANT CHANGE UP (ref 80–100)
MONOCYTES # BLD AUTO: 0.58 K/UL — SIGNIFICANT CHANGE UP (ref 0–0.9)
MONOCYTES NFR BLD AUTO: 5 % — SIGNIFICANT CHANGE UP (ref 2–14)
NEUTROPHILS # BLD AUTO: 7.98 K/UL — HIGH (ref 1.8–7.4)
NEUTROPHILS NFR BLD AUTO: 69.4 % — SIGNIFICANT CHANGE UP (ref 43–77)
NITRITE UR-MCNC: NEGATIVE — SIGNIFICANT CHANGE UP
NRBC # BLD: 0 /100 WBCS — SIGNIFICANT CHANGE UP (ref 0–0)
PH UR: 5.5 — SIGNIFICANT CHANGE UP (ref 5–8)
PLATELET # BLD AUTO: 345 K/UL — SIGNIFICANT CHANGE UP (ref 150–400)
POTASSIUM SERPL-MCNC: 4.2 MMOL/L — SIGNIFICANT CHANGE UP (ref 3.5–5.3)
POTASSIUM SERPL-SCNC: 4.2 MMOL/L — SIGNIFICANT CHANGE UP (ref 3.5–5.3)
PROT SERPL-MCNC: 9.2 G/DL — HIGH (ref 6.4–8.2)
PROT UR-MCNC: NEGATIVE MG/DL — SIGNIFICANT CHANGE UP
RBC # BLD: 6.16 M/UL — HIGH (ref 3.8–5.2)
RBC # FLD: 15.8 % — HIGH (ref 10.3–14.5)
SODIUM SERPL-SCNC: 136 MMOL/L — SIGNIFICANT CHANGE UP (ref 132–145)
SP GR SPEC: 1.02 — SIGNIFICANT CHANGE UP (ref 1–1.03)
UROBILINOGEN FLD QL: 0.2 E.U./DL — SIGNIFICANT CHANGE UP
WBC # BLD: 11.51 K/UL — HIGH (ref 3.8–10.5)
WBC # FLD AUTO: 11.51 K/UL — HIGH (ref 3.8–10.5)

## 2023-04-17 PROCEDURE — 99284 EMERGENCY DEPT VISIT MOD MDM: CPT

## 2023-04-17 RX ORDER — FAMOTIDINE 10 MG/ML
20 INJECTION INTRAVENOUS ONCE
Refills: 0 | Status: COMPLETED | OUTPATIENT
Start: 2023-04-17 | End: 2023-04-17

## 2023-04-17 RX ORDER — ONDANSETRON 8 MG/1
4 TABLET, FILM COATED ORAL ONCE
Refills: 0 | Status: COMPLETED | OUTPATIENT
Start: 2023-04-17 | End: 2023-04-17

## 2023-04-17 RX ORDER — SODIUM CHLORIDE 9 MG/ML
1000 INJECTION INTRAMUSCULAR; INTRAVENOUS; SUBCUTANEOUS ONCE
Refills: 0 | Status: COMPLETED | OUTPATIENT
Start: 2023-04-17 | End: 2023-04-17

## 2023-04-17 RX ADMIN — SODIUM CHLORIDE 1000 MILLILITER(S): 9 INJECTION INTRAMUSCULAR; INTRAVENOUS; SUBCUTANEOUS at 14:20

## 2023-04-17 RX ADMIN — FAMOTIDINE 20 MILLIGRAM(S): 10 INJECTION INTRAVENOUS at 14:20

## 2023-04-17 RX ADMIN — ONDANSETRON 4 MILLIGRAM(S): 8 TABLET, FILM COATED ORAL at 14:20

## 2023-04-17 NOTE — ED PROVIDER NOTE - NSCAREINITIATED _GEN_ER
Patient ID:  Juliane Galindo  [id number]  1981 40 year old    Admit date: 8/2/2021    Discharge date and time:  8/5/2021     Admitting Physician: Mauro Iniguez MD     Discharge Physician: Lalo Platt DO    Consultants:   IP Consult Orders (From admission, onward)     Start     Ordered    08/03/21 1010  Inpatient consult to Infectious Diseases  ONE TIME     Provider:  Anderson Hamlin MD    08/03/21 1010                Discharge Diagnoses:   Severe sepsis secondary to cellulitis of the left breast    Chronic Diagnoses:  Patient Active Problem List   Diagnosis   • IT band syndrome   • Cervicalgia   • Sepsis (CMS/[facility])         Hospital Course:   Patient presented with left breast swelling and redness.  She was found to be septic.  Ultrasound did not show any abscess.  She was placed on IV antibiotics and ID was consulted.  With IV antibiotics her symptoms significantly improved.  Plan is to discharge home with Keflex.  She will have outpatient follow-up with primary care.      Discharge Medications:     Summary of your Discharge Medications      Take these Medications      Details   acetaminophen 325 MG tablet  Commonly known as: TYLENOL   Take 650 mg by mouth every 4 hours as needed for Pain.     cephalexin 500 MG capsule  Commonly known as: Keflex   Take 1 capsule by mouth 4 times daily for 7 days.     Chromium 1000 MCG Tab   Take 1 tablet by mouth daily. Indications: pt is unaware of dosage Adds to a powdery drink.     DAILY MULTIPLE VITAMINS/ Tab   Take 2 tablets by mouth daily.     HYDROcodone-acetaminophen 5-325 MG per tablet  Commonly known as: NORCO   Take 1 tablet by mouth every 6 hours as needed for Pain.     Magnesium 100 MG Cap   Take 100 mg by mouth daily. Indications: Pt is unaware of dosage     PROBIOTIC-10 PO   Take 4 tablets by mouth nightly.              Discharge Labs:  Recent Labs   Lab 08/05/21  0516 08/04/21  0555 08/03/21  0524   SODIUM 139 139 136   POTASSIUM 3.9 3.9 3.8  Rhona Marie(Attending)   CHLORIDE 102 103 103   CO2 31 29 25   BUN 8 6 9   CREATININE 0.65 0.70 0.76   GLUCOSE 104* 105* 114*   WBC 5.8 7.3 11.5*   HGB 11.5* 11.0* 11.2*   HCT 34.4* 33.3* 33.0*    138* 143         Discharge Exam:   Blood pressure 130/84, pulse (!) 54, temperature 97.8 °F (36.6 °C), temperature source Temporal, resp. rate 16, height 5' 11\" (1.803 m), weight 96.9 kg, last menstrual period 07/25/2021, SpO2 96 %.  GEN: No acute distress.  HEART: S1, S2 auscultated.  LUNGS: Clear to ausculation bilaterally.  ABD: Soft, non-tender, active bowel sounds.  EXT: No edema.  BREAST:  Still some swelling of left breast though significantly improved.  Redness and warmth are more localized around the nipple.  No significant tenderness.  Exam was performed with the patient's nurse, Regine, at the bedside.    Disposition: Home    Patient Instructions:   Activity: activity as tolerated  Diet: regular diet  Wound Care: none needed    Code status: Pt in hospital opted for Full Resuscitation    Follow up/  Instructions:   Follow-up with primary care in 1 week.    The above plan was discussed in detail with the patient.    Less than 30 minutes spent on the care, counselling and discharge planning of the patient.    Signed:  Lalo Platt DO  8/5/2021  9:27 AM

## 2023-04-17 NOTE — ED PROVIDER NOTE - NSFOLLOWUPINSTRUCTIONS_ED_ALL_ED_FT
Please follow up with your primary care doctor in 3-4 days and address your Ozempic dosing with them.  Return to the ER if you develop any concerning symptoms.     Diarrhea    Diarrhea is frequent loose or watery bowel movements that has many causes. Diarrhea can make you feel weak and cause you to become dehydrated. Diarrhea typically lasts 2–3 days, but can last longer if it is a sign of something more serious. Drink clear fluids to prevent dehydration. Eat bland, easy-to-digest foods as tolerated.     SEEK IMMEDIATE MEDICAL CARE IF YOU HAVE ANY OF THE FOLLOWING SYMPTOMS: high fevers, lightheadedness/dizziness, chest pain, black or bloody stools, shortness of breath, severe abdominal or back pain, or any signs of dehydration.

## 2023-04-17 NOTE — ED PROVIDER NOTE - PATIENT PORTAL LINK FT
You can access the FollowMyHealth Patient Portal offered by [facility] by registering at the following website: [email or web address]. By joining [employer]’s FollowMyHealth portal, you will also be able to view your health information using other applications (apps) compatible with our system.

## 2023-04-17 NOTE — ED PROVIDER NOTE - EYES, MLM
Clear bilaterally, +left upper eyelid  with very mild stye. Clear bilaterally, +left upper eyelid  small  stye noted

## 2023-04-17 NOTE — ED PROVIDER NOTE - SKIN, MLM
+Under left armpit she has a small area of swelling noted consistent with a pimple with no fluctuance, no large abscess present. Under left armpit she has a small area of swelling noted consistent with a pimple with no fluctuance, no large abscess present.

## 2023-04-17 NOTE — ED PROVIDER NOTE - CONSTITUTIONAL, MLM
normal... Well appearing, awake, alert, oriented to person, place, time/situation and in no apparent distress. Well appearing, awake, alert and in no apparent distress.

## 2023-04-17 NOTE — ED ADULT NURSE NOTE - OBJECTIVE STATEMENT
Patient presents with complaints of nausea and diarrhea since Saturday. No abdominal pain or vomiting. Didn't eat anything unusual. Patient takes ozempic for DM and her doctor increase her dose from 1 mg to 2 mg on Saturday and since then she has been having nausea and diarrhea. Denies fever, chills, SOB, CP, dizziness.

## 2023-04-17 NOTE — ED PROVIDER NOTE - OBJECTIVE STATEMENT
21 y/o F with Hx of DM on Ozempic, Farxiga, and metformin, vaccinated for COVID but not the flu, presents today with multiple complaints. Patient states she has had nausea and diarrhea since she increased her Ozempic dose from 1mg to 2mg on Saturday, 2 days ago. Ever since she took the new dose she has been having nausea and diarrhea. Yesterday she had 8 episodes of loose non bloody stools, and today she had 3 episodes of loose non bloody stools. No abdominal pain, no vomiting. Patient also complaining of a lump underneath her left armpit that she has been draining for the last couple of days. She shaves her armpits. Patient also complaining of left upper eyelid swelling and pain. No blurry vision, no conjunctival redness, no discharge. No smoking, no EtOH use, no drug use. 21 y/o F with elevated BMI, Hx of DM on Ozempic, Farxiga, and metformin, vaccinated for COVID but not the flu, presents today with multiple complaints. Patient states she has had nausea and diarrhea since she increased her Ozempic dose from 1mg to 2mg on Saturday, 2 days ago. Ever since she took the new dose she has been having nausea and diarrhea. Yesterday she had 8 episodes of loose non bloody stools, and today she had 3 episodes of loose non bloody stools. No abdominal pain, no vomiting. Patient also complaining of a lump underneath her left armpit that she has been draining for the last couple of days. She shaves her armpits. Patient also complaining of left upper eyelid swelling and pain. No blurry vision, no conjunctival redness, no discharge. No smoking, no EtOH use, no drug use.

## 2023-04-17 NOTE — ED PROVIDER NOTE - CLINICAL SUMMARY MEDICAL DECISION MAKING FREE TEXT BOX
23 y/o F with Hx of DM presents for nausea and diarrhea after increasing her Ozempic dose 2 days ago, lump under armpit that is draining, and mild left upper eyelid swelling.     ED Course: Vital signs noted. Patient is afebrile. The rest of the vital signs are within normal limits.   Will obtain blood work. Patient given Zofran and Pepcid. Will obtain pregnancy blood work and UA. Patient given IV fluids, Zofran, and Pepcid. Patient reassured and advised to apply warm compresses and to take Tylenol and Motrin as needed for pain for the armpit and eyelid complaints. Will reevaluate. 23 y/o F with Hx of DM presents for nausea and diarrhea after increasing her Ozempic dose 2 days ago, lump under armpit that is draining, and mild left upper eyelid swelling.     ED Course: Vital signs noted. Patient is afebrile. The rest of the vital signs are within normal limits.   Blood work noted, pt with elevated WBC with no shift. Rest of labs WNL. Pregnancy test and UA negative. Patient given IV fluids, Zofran, and Pepcid. Patient reassured and advised to apply warm compresses and to take Tylenol and Motrin as needed for swelling/pain to the armpit and eyelid. Feeling better post meds. Non-toxic appearing and stable for discharge. To follow up outpatient with her PCP regarding Ozempic dosing. Strict return precautions given. 23 y/o F with Hx of DM presents for nausea and diarrhea after increasing her Ozempic dose 2 days ago, lump under armpit that is draining, and mild left upper eyelid swelling.     ED Course: Vital signs noted. Patient is afebrile. The rest of the vital signs are within normal limits.   Blood work noted, pt with mildly elevated WBC with no shift. Rest of labs WNL. Pregnancy test and UA negative. Patient given IV fluids, Zofran, and Pepcid. Patient reassured and advised to apply warm compresses and to take Tylenol and Motrin as needed for swelling/pain to the armpit and eyelid. Feeling better post meds. Non-toxic appearing and stable for discharge. To follow up outpatient with her PCP regarding Ozempic dosing. Strict return precautions given.

## 2023-08-16 NOTE — ED ADULT TRIAGE NOTE - TEMPERATURE IN CELSIUS (DEGREES C)
Caller: Anabelle Edward    Relationship: Emergency Contact    Best call back number: [phone]    What is the best time to reach you: ANY    Who are you requesting to speak with (clinical staff, provider,  specific staff member): SCHEDULING    Do you know the name of the person who called: NO    What was the call regarding: BIOPSY    Is it okay if the provider responds through MyChart: NO    Notes:      MRS. EDWARD RETURNED CALL FROM OFFICE. SHE STATED THAT PATIENT HAD A BIOPSY SCHEDULED FOR 08/17/23. INFORMED MRS. EDWARD THAT THE APPOINTMENT WAS CANCELED. PSA IS NEEDED BEFORE THE APPOINTMENT.     PLEASE CALL PATIENT TO RESCHEDULE BIOPSY.    ---UNABLE TO WARM TRANSFER.    36.7

## 2023-08-21 ENCOUNTER — EMERGENCY (EMERGENCY)
Facility: HOSPITAL | Age: 23
LOS: 1 days | Discharge: HOME | End: 2023-08-21
Attending: EMERGENCY MEDICINE | Admitting: EMERGENCY MEDICINE
Payer: MEDICAID

## 2023-08-21 VITALS
TEMPERATURE: 98 F | SYSTOLIC BLOOD PRESSURE: 115 MMHG | HEIGHT: 62 IN | RESPIRATION RATE: 16 BRPM | HEART RATE: 74 BPM | WEIGHT: 164.91 LBS | DIASTOLIC BLOOD PRESSURE: 79 MMHG | OXYGEN SATURATION: 98 %

## 2023-08-21 VITALS — HEART RATE: 68 BPM | OXYGEN SATURATION: 98 % | RESPIRATION RATE: 16 BRPM

## 2023-08-21 PROCEDURE — 99284 EMERGENCY DEPT VISIT MOD MDM: CPT

## 2023-08-21 RX ORDER — FAMOTIDINE 10 MG/ML
1 INJECTION INTRAVENOUS
Qty: 10 | Refills: 0
Start: 2023-08-21 | End: 2023-08-25

## 2023-08-21 RX ORDER — DIPHENHYDRAMINE HCL 50 MG
1 CAPSULE ORAL
Qty: 24 | Refills: 0
Start: 2023-08-21 | End: 2023-08-24

## 2023-08-21 RX ORDER — FAMOTIDINE 10 MG/ML
20 INJECTION INTRAVENOUS ONCE
Refills: 0 | Status: COMPLETED | OUTPATIENT
Start: 2023-08-21 | End: 2023-08-21

## 2023-08-21 RX ORDER — DIPHENHYDRAMINE HCL 50 MG
25 CAPSULE ORAL ONCE
Refills: 0 | Status: COMPLETED | OUTPATIENT
Start: 2023-08-21 | End: 2023-08-21

## 2023-08-21 RX ADMIN — Medication 60 MILLIGRAM(S): at 21:14

## 2023-08-21 RX ADMIN — Medication 25 MILLIGRAM(S): at 21:10

## 2023-08-21 RX ADMIN — FAMOTIDINE 20 MILLIGRAM(S): 10 INJECTION INTRAVENOUS at 21:10

## 2023-08-21 NOTE — ED PROVIDER NOTE - OBJECTIVE STATEMENT
Patient is a 23-year-old female with no significant past medical history who presents with hives to upper chest back abdomen and arms.  Patient notes the hives started yesterday early morning shortly after eating lamb and beef.  Patient notes prior ingestion of both and with no complications.  She currently takes Ozempic for several months and has not had a complication like this.  Patient denies throat tightness, shortness of breath, wheezing, nausea vomiting or diarrhea.  Patient notes the rash is uncomfortable and itchy and burning.  Otherwise denies fever chills.  Non-smoker denies drugs or alcohol.  Denies any other exposures.  Has not taken medications for the symptoms

## 2023-08-21 NOTE — ED ADULT TRIAGE NOTE - CHIEF COMPLAINT QUOTE
Pt. walk in c/o itchy rash to neck, upper chest, back, and arms since last night after eating beef and lamb that tasted off. Denies SOB, swelling of throat, and swelling of lips. Denies use of new products.

## 2023-08-21 NOTE — ED PROVIDER NOTE - CLINICAL SUMMARY MEDICAL DECISION MAKING FREE TEXT BOX
Patient with no past medical history on Ozempic for weight loss otherwise well presents with pruritic rash to upper chest back and upper extremities and on physical exam consistent with hive.  Patient endorses that rash started shortly after having a meal that had lamb and beef.  Patient notes no prior history of allergies and has been on Ozempic with no issues for several months.  We will start her on prednisone Pepcid and Benadryl.  Will likely discharge with similar and patient has a PCP who will obtain a allergist referral.  Patient has no oral airway edema or no respiratory symptoms.

## 2023-08-21 NOTE — ED ADULT NURSE NOTE - NSFALLUNIVINTERV_ED_ALL_ED
Bed/Stretcher in lowest position, wheels locked, appropriate side rails in place/Call bell, personal items and telephone in reach/Instruct patient to call for assistance before getting out of bed/chair/stretcher/Non-slip footwear applied when patient is off stretcher/[place] to call system/Physically safe environment - no spills, clutter or unnecessary equipment/Purposeful proactive rounding/Room/bathroom lighting operational, light cord in reach

## 2023-08-21 NOTE — ED ADULT NURSE NOTE - OBJECTIVE STATEMENT
Patient presents to ED c/o rash and pruritis x last night s/p eating beef. Rash noted to upper arms, chest, face, and back. Patient denies any swelling of the throat/tongue, difficulty swallowing, difficulty breathing, or voice changes. Patient aox4, ambulatory with steady gait, spont unlabored breathing on ra.

## 2023-08-21 NOTE — ED PROVIDER NOTE - PATIENT PORTAL LINK FT
You can access the FollowMyHealth Patient Portal offered by [facility] by registering at the following website: [email or web address]. By joining [employer]’s FollowMyHealth portal, you will also be able to view your health information using other applications (apps) compatible with our system.

## 2023-08-21 NOTE — ED ADULT TRIAGE NOTE - HEIGHT IN CM
Patient would like Dr Nunez to take over medication treatment for COPD.  Patient sees THIERNO Wilson in Respiratory.  Please advise.    [phone]

## 2023-08-21 NOTE — ED PROVIDER NOTE - SKIN RASH DESCRIPTION
Diffuse hives to upper extremities no vesicular lesions no petechiae no streaking nontender no crepitus sparing palms sparing oral mucosa./REDDENED/WHEAL

## 2023-08-24 DIAGNOSIS — R21 RASH AND OTHER NONSPECIFIC SKIN ERUPTION: ICD-10-CM

## 2023-08-24 DIAGNOSIS — L50.0 ALLERGIC URTICARIA: ICD-10-CM

## 2023-08-24 DIAGNOSIS — T78.1XXA OTHER ADVERSE FOOD REACTIONS, NOT ELSEWHERE CLASSIFIED, INITIAL ENCOUNTER: ICD-10-CM

## 2023-11-25 ENCOUNTER — EMERGENCY (EMERGENCY)
Facility: HOSPITAL | Age: 23
LOS: 1 days | Discharge: HOME | End: 2023-11-25
Attending: EMERGENCY MEDICINE | Admitting: EMERGENCY MEDICINE
Payer: MEDICAID

## 2023-11-25 VITALS
OXYGEN SATURATION: 96 % | SYSTOLIC BLOOD PRESSURE: 111 MMHG | RESPIRATION RATE: 16 BRPM | DIASTOLIC BLOOD PRESSURE: 80 MMHG | HEART RATE: 87 BPM

## 2023-11-25 VITALS
HEART RATE: 103 BPM | SYSTOLIC BLOOD PRESSURE: 112 MMHG | RESPIRATION RATE: 18 BRPM | DIASTOLIC BLOOD PRESSURE: 71 MMHG | TEMPERATURE: 98 F | OXYGEN SATURATION: 98 %

## 2023-11-25 LAB
APPEARANCE UR: CLEAR — SIGNIFICANT CHANGE UP
APPEARANCE UR: CLEAR — SIGNIFICANT CHANGE UP
BACTERIA # UR AUTO: ABNORMAL /HPF
BACTERIA # UR AUTO: ABNORMAL /HPF
BILIRUB UR-MCNC: NEGATIVE — SIGNIFICANT CHANGE UP
BILIRUB UR-MCNC: NEGATIVE — SIGNIFICANT CHANGE UP
COD CRY URNS QL: SIGNIFICANT CHANGE UP
COD CRY URNS QL: SIGNIFICANT CHANGE UP
COLOR SPEC: YELLOW — SIGNIFICANT CHANGE UP
COLOR SPEC: YELLOW — SIGNIFICANT CHANGE UP
DIFF PNL FLD: ABNORMAL
DIFF PNL FLD: ABNORMAL
EPI CELLS # UR: 1 — SIGNIFICANT CHANGE UP
EPI CELLS # UR: 1 — SIGNIFICANT CHANGE UP
GLUCOSE BLDC GLUCOMTR-MCNC: 129 MG/DL — HIGH (ref 70–99)
GLUCOSE BLDC GLUCOMTR-MCNC: 129 MG/DL — HIGH (ref 70–99)
GLUCOSE UR QL: >=1000 MG/DL
GLUCOSE UR QL: >=1000 MG/DL
GRAN CASTS # UR COMP ASSIST: SIGNIFICANT CHANGE UP
GRAN CASTS # UR COMP ASSIST: SIGNIFICANT CHANGE UP
HCG SERPL-ACNC: 8070 MIU/ML — HIGH
HCG SERPL-ACNC: 8070 MIU/ML — HIGH
HCG UR QL: POSITIVE — SIGNIFICANT CHANGE UP
HCG UR QL: POSITIVE — SIGNIFICANT CHANGE UP
HYALINE CASTS # UR AUTO: SIGNIFICANT CHANGE UP
HYALINE CASTS # UR AUTO: SIGNIFICANT CHANGE UP
KETONES UR-MCNC: 40 MG/DL
KETONES UR-MCNC: 40 MG/DL
LEUKOCYTE ESTERASE UR-ACNC: NEGATIVE — SIGNIFICANT CHANGE UP
LEUKOCYTE ESTERASE UR-ACNC: NEGATIVE — SIGNIFICANT CHANGE UP
NITRITE UR-MCNC: NEGATIVE — SIGNIFICANT CHANGE UP
NITRITE UR-MCNC: NEGATIVE — SIGNIFICANT CHANGE UP
PH UR: 5 — SIGNIFICANT CHANGE UP (ref 5–8)
PH UR: 5 — SIGNIFICANT CHANGE UP (ref 5–8)
PROT UR-MCNC: NEGATIVE MG/DL — SIGNIFICANT CHANGE UP
PROT UR-MCNC: NEGATIVE MG/DL — SIGNIFICANT CHANGE UP
RBC CASTS # UR COMP ASSIST: 2 /HPF — SIGNIFICANT CHANGE UP (ref 0–4)
RBC CASTS # UR COMP ASSIST: 2 /HPF — SIGNIFICANT CHANGE UP (ref 0–4)
SP GR SPEC: 1.02 — SIGNIFICANT CHANGE UP (ref 1–1.03)
SP GR SPEC: 1.02 — SIGNIFICANT CHANGE UP (ref 1–1.03)
TRI-PHOS CRY UR QL COMP ASSIST: SIGNIFICANT CHANGE UP
TRI-PHOS CRY UR QL COMP ASSIST: SIGNIFICANT CHANGE UP
URATE CRY FLD QL MICRO: SIGNIFICANT CHANGE UP
URATE CRY FLD QL MICRO: SIGNIFICANT CHANGE UP
UROBILINOGEN FLD QL: 0.2 MG/DL — SIGNIFICANT CHANGE UP (ref 0.2–1)
UROBILINOGEN FLD QL: 0.2 MG/DL — SIGNIFICANT CHANGE UP (ref 0.2–1)
WBC UR QL: 2 /HPF — SIGNIFICANT CHANGE UP (ref 0–5)
WBC UR QL: 2 /HPF — SIGNIFICANT CHANGE UP (ref 0–5)

## 2023-11-25 PROCEDURE — 99284 EMERGENCY DEPT VISIT MOD MDM: CPT

## 2023-11-25 NOTE — ED ADULT NURSE NOTE - OBJECTIVE STATEMENT
Pt reports she took a pregnancy test at home and it was positive. She is requesting confirmation testing. LMP 10/16. Reports abd bloating.

## 2023-11-25 NOTE — ED ADULT TRIAGE NOTE - CHIEF COMPLAINT QUOTE
Pt complaining of abd bloating. Pt states that she took a pregnancy test that was positive, requesting repeat test and blood work. lmp 10/16.

## 2023-11-25 NOTE — ED PROVIDER NOTE - CLINICAL SUMMARY MEDICAL DECISION MAKING FREE TEXT BOX
22 yo F PMH DM2 on ozempic presents for confirmatory pregnancy testing.  UA shows glucose and ketones.  However, patient is on ozempic and has no s/s of DKA.  Will check finger stick, add further testing if glucose is elevated.  No bleeding or pain to prompt ultrasound at this time.  Patient asked multiple times if she is having pain and she denies.

## 2023-11-25 NOTE — ED ADULT NURSE NOTE - NSFALLUNIVINTERV_ED_ALL_ED
Bed/Stretcher in lowest position, wheels locked, appropriate side rails in place/Call bell, personal items and telephone in reach/Instruct patient to call for assistance before getting out of bed/chair/stretcher/Non-slip footwear applied when patient is off stretcher/[place] to call system/Physically safe environment - no spills, clutter or unnecessary equipment/Purposeful proactive rounding/Room/bathroom lighting operational, light cord in reach

## 2023-11-25 NOTE — ED PROVIDER NOTE - PHYSICAL EXAMINATION
General:  Well appearing, no distress  HEENT:  No conjunctival injection, neck supple, no congestion   Chest:  Non-tender, no crepitance  Lungs:  Clear to auscultation bilaterally   Heart:  s1s2 normal, no murmur  Abdomen:  soft, non-tender, non-distended  :  Deferred  Rectal:  Deferred  Extremities: full rom   Neuro:  Alert, conversant, motor/sensory grossly intact   Psychiatry:  Calm, cooperative, no expression of suicidal or homicidal ideation

## 2023-11-25 NOTE — ED PROVIDER NOTE - NSFOLLOWUPINSTRUCTIONS_ED_ALL_ED_FT
First Trimester Pregnancy    WHAT YOU NEED TO KNOW:    What do I need to know about the first trimester of pregnancy? A normal pregnancy lasts about 40 weeks. The first trimester lasts from your last period through the 12th week of pregnancy.    What is prenatal care? Prenatal care is a series of visits with your healthcare provider throughout your pregnancy. Prenatal care can help prevent problems during pregnancy and childbirth. At each prenatal visit, your healthcare provider will weigh you and check your blood pressure. Your healthcare provider will also check your baby's heartbeat and growth. You may also need the following at some visits:    A pelvic exam allows your healthcare provider to see your cervix (the bottom part of your uterus). Your healthcare provider will use a speculum to open your vagina. He or she will check the size and shape of your uterus.    Blood tests may be done to check for any of the following:  Gestational diabetes or anemia (low iron level)    Blood type or Rh factor, or certain birth defects    Immunity to certain diseases, such as chickenpox or rubella    An infection, such as a sexually transmitted infection, HIV, or hepatitis B    Hepatitis B may need to be prevented or treated. Hepatitis B is inflammation of the liver caused by the hepatitis B virus (HBV). HBV can spread from a mother to her baby during delivery. You will be checked for HBV as early as possible in the first trimester of each pregnancy. You need the test even if you received the hepatitis B vaccine or were tested before. You may need to have an HBV infection treated before you give birth.    Urine tests may also be done to check for sugar and protein. These can be signs of gestational diabetes or preeclampsia. Urine tests may also be done to check for signs of infection.    A fetal ultrasound shows pictures of your baby inside your uterus. The pictures are used to check your baby's development, movement, and position. Your healthcare provider may be able to tell you if you are having a boy or a girl during the ultrasound. This is usually possible at around 18 to 22 weeks.  Pregnancy Ultrasound      Genetic disorder screening tests may be offered to you. These screening tests check your baby's risk for genetic disorders such as Down syndrome. A screening test includes a blood test and ultrasound. Blood tests may be used to check your DNA or your partner's DNA. Genetic tests are not always accurate or complete. Your baby may be born with a genetic disorder that did not show up in the tests. Talk to your healthcare provider about any concerns you have with genetic testing.  What can I do to have a healthy pregnancy?  Tips for a Healthy Pregnancy    Take prenatal vitamins as directed. Prenatal vitamins provide some of the extra vitamins and minerals you need during pregnancy. The vitamins will contain 400 to 800 micrograms (mcg) of folic acid to help prevent certain conditions, such as spina bifida. You may have started taking prenatal vitamins or folic acid supplements 1 month or more before you became pregnant. Continue taking them through the first trimester.  Sources of Folic Acid      Eat a variety of healthy foods. Healthy foods include fruits, vegetables, whole-grain breads, low-fat dairy foods, beans, lean meats, and fish. Drink liquids as directed. Ask how much liquid to drink each day and which liquids are best for you. Limit caffeine to less than 200 milligrams each day. Limit your intake of fish to 2 servings each week. Choose fish low in mercury such as canned light tuna, shrimp, crab, salmon, cod, or tilapia. Do not eat fish high in mercury such as swordfish, tilefish, luz mackerel, and shark.  Healthy Foods      Drink liquids as directed. Ask how much liquid to drink each day and which liquids are best for you. It is not clear how caffeine affects pregnancy. Limit your intake of caffeine to less than 200 mg each day to avoid possible health problems. Caffeine may be found in coffee, tea, cola, sports drinks, and chocolate. Do not drink alcohol.    Talk to your healthcare provider before you take medicines. Many medicines can harm your baby, especially during early pregnancy. Ask your healthcare provider before you take any medicines, including over-the-counter medicines, vitamins, herbs, or food supplements. Never use illegal or street drugs while you are pregnant. Talk to your healthcare provider if you are having trouble quitting street drugs.    Exercise as directed. Ask your healthcare provider about the best exercise plan for you. Exercise may help you feel better and make your labor and delivery easier.  Walking During Pregnancy      Do not smoke. Smoking increases your risk of a miscarriage and other health problems during your pregnancy. Smoking can cause your baby to be born too early or weigh less at birth. Quit smoking as soon as you think you might be pregnant. Ask your healthcare provider for information if you need help quitting.  What are some safety tips?    Do not use a hot tub or sauna while you are pregnant, especially during your first trimester. Hot tubs and saunas may raise your baby's temperature and increase the risk of birth defects. It also increases your risk of miscarriage.    Protect yourself from illness. Toxoplasmosis is a disease that can cause birth defects. To protect yourself from this disease, do not clean your cat's litter box yourself. Ask someone else to clean your cat's litter box while you are pregnant. Also, do not eat raw meat or unwashed fruits and vegetables. Wash your hands after you touch raw meat, and eat only well-cooked meat. Wash fruits and vegetables well before you eat them.    RETURN TO THE EMERGENCY ROOM IMMEDIATELY FOR:    You have pain or cramping in your abdomen or low back.    You have vaginal bleeding or clotting.    You have chills or a fever.    You have vaginal itching, burning, or pain.    You have yellow, green, white, or foul-smelling vaginal discharge.    You have pain or burning when you urinate, less urine than usual, or pink or bloody urine.    You have questions or concerns about your condition or care.  CARE AGREEMENT:    You have the right to help plan your care. Learn about your health condition and how it may be treated. Discuss treatment options with your healthcare providers to decide what care you want to receive. You always have the right to refuse treatment.    © [employer] 1973, 2023 First Trimester Pregnancy    WHAT YOU NEED TO KNOW:    PLANNED PARENTHOOD IS A GOOD SOURCE OF PRENATAL CARE AND CAN TALK TO YOU ABOUT OPTIONS.     OZEMPIC IS NOT CONSIDERED SAFE IN PREGNANCY.  PLEASE SPEAK TO YOUR DOCTOR BEFORE YOUR NEXT DOSE.    What do I need to know about the first trimester of pregnancy? A normal pregnancy lasts about 40 weeks. The first trimester lasts from your last period through the 12th week of pregnancy.    What is prenatal care? Prenatal care is a series of visits with your healthcare provider throughout your pregnancy. Prenatal care can help prevent problems during pregnancy and childbirth. At each prenatal visit, your healthcare provider will weigh you and check your blood pressure. Your healthcare provider will also check your baby's heartbeat and growth. You may also need the following at some visits:    A pelvic exam allows your healthcare provider to see your cervix (the bottom part of your uterus). Your healthcare provider will use a speculum to open your vagina. He or she will check the size and shape of your uterus.    Blood tests may be done to check for any of the following:  Gestational diabetes or anemia (low iron level)    Blood type or Rh factor, or certain birth defects    Immunity to certain diseases, such as chickenpox or rubella    An infection, such as a sexually transmitted infection, HIV, or hepatitis B    Hepatitis B may need to be prevented or treated. Hepatitis B is inflammation of the liver caused by the hepatitis B virus (HBV). HBV can spread from a mother to her baby during delivery. You will be checked for HBV as early as possible in the first trimester of each pregnancy. You need the test even if you received the hepatitis B vaccine or were tested before. You may need to have an HBV infection treated before you give birth.    Urine tests may also be done to check for sugar and protein. These can be signs of gestational diabetes or preeclampsia. Urine tests may also be done to check for signs of infection.    A fetal ultrasound shows pictures of your baby inside your uterus. The pictures are used to check your baby's development, movement, and position. Your healthcare provider may be able to tell you if you are having a boy or a girl during the ultrasound. This is usually possible at around 18 to 22 weeks.  Pregnancy Ultrasound      Genetic disorder screening tests may be offered to you. These screening tests check your baby's risk for genetic disorders such as Down syndrome. A screening test includes a blood test and ultrasound. Blood tests may be used to check your DNA or your partner's DNA. Genetic tests are not always accurate or complete. Your baby may be born with a genetic disorder that did not show up in the tests. Talk to your healthcare provider about any concerns you have with genetic testing.  What can I do to have a healthy pregnancy?  Tips for a Healthy Pregnancy    Take prenatal vitamins as directed. Prenatal vitamins provide some of the extra vitamins and minerals you need during pregnancy. The vitamins will contain 400 to 800 micrograms (mcg) of folic acid to help prevent certain conditions, such as spina bifida. You may have started taking prenatal vitamins or folic acid supplements 1 month or more before you became pregnant. Continue taking them through the first trimester.  Sources of Folic Acid      Eat a variety of healthy foods. Healthy foods include fruits, vegetables, whole-grain breads, low-fat dairy foods, beans, lean meats, and fish. Drink liquids as directed. Ask how much liquid to drink each day and which liquids are best for you. Limit caffeine to less than 200 milligrams each day. Limit your intake of fish to 2 servings each week. Choose fish low in mercury such as canned light tuna, shrimp, crab, salmon, cod, or tilapia. Do not eat fish high in mercury such as swordfish, tilefish, luz mackerel, and shark.  Healthy Foods      Drink liquids as directed. Ask how much liquid to drink each day and which liquids are best for you. It is not clear how caffeine affects pregnancy. Limit your intake of caffeine to less than 200 mg each day to avoid possible health problems. Caffeine may be found in coffee, tea, cola, sports drinks, and chocolate. Do not drink alcohol.    Talk to your healthcare provider before you take medicines. Many medicines can harm your baby, especially during early pregnancy. Ask your healthcare provider before you take any medicines, including over-the-counter medicines, vitamins, herbs, or food supplements. Never use illegal or street drugs while you are pregnant. Talk to your healthcare provider if you are having trouble quitting street drugs.    Exercise as directed. Ask your healthcare provider about the best exercise plan for you. Exercise may help you feel better and make your labor and delivery easier.  Walking During Pregnancy      Do not smoke. Smoking increases your risk of a miscarriage and other health problems during your pregnancy. Smoking can cause your baby to be born too early or weigh less at birth. Quit smoking as soon as you think you might be pregnant. Ask your healthcare provider for information if you need help quitting.  What are some safety tips?    Do not use a hot tub or sauna while you are pregnant, especially during your first trimester. Hot tubs and saunas may raise your baby's temperature and increase the risk of birth defects. It also increases your risk of miscarriage.    Protect yourself from illness. Toxoplasmosis is a disease that can cause birth defects. To protect yourself from this disease, do not clean your cat's litter box yourself. Ask someone else to clean your cat's litter box while you are pregnant. Also, do not eat raw meat or unwashed fruits and vegetables. Wash your hands after you touch raw meat, and eat only well-cooked meat. Wash fruits and vegetables well before you eat them.    RETURN TO THE EMERGENCY ROOM IMMEDIATELY FOR:    You have pain or cramping in your abdomen or low back.    You have vaginal bleeding or clotting.    You have chills or a fever.    You have vaginal itching, burning, or pain.    You have yellow, green, white, or foul-smelling vaginal discharge.    You have pain or burning when you urinate, less urine than usual, or pink or bloody urine.    You have questions or concerns about your condition or care.  CARE AGREEMENT:    You have the right to help plan your care. Learn about your health condition and how it may be treated. Discuss treatment options with your healthcare providers to decide what care you want to receive. You always have the right to refuse treatment.    © [employer] 1973, 2023

## 2023-11-25 NOTE — ED PROVIDER NOTE - CARE PROVIDER_API CALL
Ruben Araya  Obstetrics and Gynecology  [address] [address]  Phone: [phone]  Fax: [phone]  Follow Up Time:

## 2023-11-25 NOTE — ED ADULT NURSE NOTE - NS ED NURSE DISCH DISPOSITION
Discharged
Bed in lowest position, wheels locked, appropriate side rails in place/Call bell, personal items and telephone in reach/Instruct patient to call for assistance before getting out of bed or chair/Non-slip footwear when patient is out of bed/[place] to call system/Physically safe environment - no spills, clutter or unnecessary equipment/Purposeful Proactive Rounding/Room/bathroom lighting operational, light cord in reach

## 2023-11-25 NOTE — ED PROVIDER NOTE - OBJECTIVE STATEMENT
22 yo F LMP October 16 missed her period, took a home preg test that was positive and would like confirmation  Patient is a diabetic on ozempic, drank soda prior to arrival and giving urine sample  Denies: n/v/d/abd pain/polydipsia/polyuria/sob/vag bleeding/urinary symptoms

## 2023-11-25 NOTE — ED PROVIDER NOTE - PATIENT PORTAL LINK FT
You can access the FollowMyHealth Patient Portal offered by [facility] by registering at the following website: [email or web address]. By joining [employer]’s FollowMyHealth portal, you will also be able to view your health information using other applications (apps) compatible with our system.

## 2023-11-27 DIAGNOSIS — Z32.01 ENCOUNTER FOR PREGNANCY TEST, RESULT POSITIVE: ICD-10-CM

## 2023-11-27 DIAGNOSIS — Z3A.01 LESS THAN 8 WEEKS GESTATION OF PREGNANCY: ICD-10-CM

## 2024-02-13 NOTE — ED PROVIDER NOTE - DATA REVIEWED, MDM
Patient complained of right arm pain. Provider informed with orders made and carried out.    vital signs

## 2024-05-07 NOTE — ED ADULT NURSE NOTE - HISTORY OF COVID-19 VACCINATION
Diagnosis:   1. Malignant neoplasm of central portion of left breast in female, estrogen receptor negative  (CMD)         Cycle/Day/Regimen: C2 q6 wk Keytruda    MURPHY Silva is ordering clinician today.    Vital Signs: See VS flowsheet for details     Allergies:    ALLERGIES:   Allergen Reactions    Adhesive   (Environmental) Other (See Comments)     Cannot tolerate adhesive on tape/bandages-requests paper tape    Seasonal Runny Nose        Medications:  The medication list was reviewed. No changes noted.     ECOG: See ECOG flowsheet for details    Distress Screening: Is this day one of cycle or a new regimen? No No, patient did not indicate any new concerns. Refer to most recent PHQ2/9 score    Toxicity Assessment: See tox assessment flowsheet for details     Additional Nursing Assessment: Pt here for C2 q6 wk Keytuda. Pt sts she is feeling OK today. Reporting significant fatigue and ~20# wt loss since last treatment. Sts her appetite is somewhat down, but not significantly. Sts she has been eating healthier. Port accessed and labs sent prior to clinic visit. Results WNL for treatment. OK to proceed.     Prechemo Checklist  Chemo Consent Signed: Yes  Protocol Verified: Yes  Is Protocol Standard of Care or Research?: Standard of Care  Pre-Chemo Labs Reviewed?: Yes  Provider Notified of Abnormal Labs Not Meeting Treatment Conditions: N/A  Pregnancy Screening Performed (if indicated): Yes (surgical)  All Treatment Conditions Met?: Yes  BSA/weight in Orders Verified for Weight-Based Drugs?: Yes  Chemo Dose Calculations Verified: Yes  Second RN Verified Calculations: see MAR      Pre-Treatment: Patient has valid pre-authorization, Premed orders, including hydration, are verified prior to administration, and I have reviewed the following with the patient: Name of chemo drug, duration and route of infusion, Infusion/Drug volume and dose, and reportable infusion-related symptoms.     Treatment: Refer to LDA and MAR for line  assessment and medication administration, Chemotherapy has not ; double checked & verified by two practitioners, Appearance and physical integrity of drugs meets standard of drug monograph; double checked & verified by two practitioners, Rate set on infusion pump is in alignment with ordered rate; double checked & verified by two practitioners, Drugs were administered in proper sequencing, Blood return confirmed before, during and after treatment administered, and Infusion pump used for non-vesicant drugs    Post Treatment: Treatment tolerated well; no adverse reaction    Oral Chemotherapy: No.    Education: No new instructions needed    Next appointment scheduled:     - visit with CHANDRIKA Herrera   - Labs + MD visit + Infusion    Patient instructed to call the office with any questions or concerns.    Patient Discharged: Pt A&Ox4 upon DC. NADN. Ambulatory with steady gait. +blood return to port prior to and post infusion. Port saline flushed and de-accessed prior to DC.    Yes

## 2024-06-10 NOTE — ED PROVIDER NOTE - NSDCPRINTRESULTS_ED_ALL_ED
PAST SURGICAL HISTORY:  No significant past surgical history      Patient requests all Lab, Cardiology, and Radiology Results on their Discharge Instructions

## 2024-08-02 NOTE — ED ADULT NURSE NOTE - NSFALLRSKPASTHIST_ED_ALL_ED
You can access the FollowMyHealth Patient Portal offered by [facility] by registering at the following website: [email or web address]. By joining [employer]’s FollowMyHealth portal, you will also be able to view your health information using other applications (apps) compatible with our system.
no

## 2024-09-16 ENCOUNTER — EMERGENCY (EMERGENCY)
Facility: HOSPITAL | Age: 24
LOS: 1 days | Discharge: HOME | End: 2024-09-16
Attending: EMERGENCY MEDICINE | Admitting: EMERGENCY MEDICINE
Payer: MEDICAID

## 2024-09-16 VITALS
DIASTOLIC BLOOD PRESSURE: 83 MMHG | RESPIRATION RATE: 16 BRPM | HEART RATE: 74 BPM | TEMPERATURE: 98 F | SYSTOLIC BLOOD PRESSURE: 118 MMHG | OXYGEN SATURATION: 98 %

## 2024-09-16 VITALS
SYSTOLIC BLOOD PRESSURE: 111 MMHG | DIASTOLIC BLOOD PRESSURE: 83 MMHG | OXYGEN SATURATION: 100 % | TEMPERATURE: 98 F | RESPIRATION RATE: 18 BRPM | HEART RATE: 83 BPM

## 2024-09-16 LAB
APPEARANCE UR: CLEAR — SIGNIFICANT CHANGE UP
BILIRUB UR-MCNC: NEGATIVE — SIGNIFICANT CHANGE UP
COLOR SPEC: YELLOW — SIGNIFICANT CHANGE UP
DIFF PNL FLD: NEGATIVE — SIGNIFICANT CHANGE UP
GLUCOSE UR QL: >=1000 MG/DL
HCG UR QL: NEGATIVE — SIGNIFICANT CHANGE UP
HIV 1 & 2 AB SERPL IA.RAPID: SIGNIFICANT CHANGE UP
KETONES UR-MCNC: NEGATIVE MG/DL — SIGNIFICANT CHANGE UP
LEUKOCYTE ESTERASE UR-ACNC: NEGATIVE — SIGNIFICANT CHANGE UP
NITRITE UR-MCNC: NEGATIVE — SIGNIFICANT CHANGE UP
PH UR: 7 — SIGNIFICANT CHANGE UP (ref 5–8)
PROT UR-MCNC: NEGATIVE MG/DL — SIGNIFICANT CHANGE UP
SP GR SPEC: 1.04 — HIGH (ref 1–1.03)
UROBILINOGEN FLD QL: 0.2 MG/DL — SIGNIFICANT CHANGE UP (ref 0.2–1)

## 2024-09-16 PROCEDURE — 99284 EMERGENCY DEPT VISIT MOD MDM: CPT

## 2024-09-16 NOTE — ED PROVIDER NOTE - ATTENDING CONTRIBUTION TO CARE
24-year-old female with no past medical history presented to the emergency room with vaginal itching, concerns for possible foreign body.  Vaginal exam reveals no significant abnormalities, I was the chaperone for resident, Dr. Serrano.  Patient consents to ED STI testing but does not want to get prophylactic treatment.

## 2024-09-16 NOTE — ED ADULT NURSE REASSESSMENT NOTE - NS ED NURSE REASSESS COMMENT FT1
Pt dc and all education given and gone over with pt. Pt has no further questions and self ambulating out of ed with all belongings and ppwrk. Verbal confirmation that pt understands  All information including refferals, resources, recomendations and  medications (if applicable) gone over with pateint.  Pt not with any current complaints and reports feeling great,   Care complete

## 2024-09-16 NOTE — ED PROVIDER NOTE - OBJECTIVE STATEMENT
24-year-old female with history of T2DM on Ozempic and Farxiga presenting with vaginal itching and concern for vaginal foreign body.  Per patient she was with her partner who was concerned that he lost his ring inside of her.  She has also had vaginal itching for the last few days.  She took a dose of fluconazole at home without significant improvement.  She otherwise has no significant headache, chest pain, shortness of breath, N/V/D/abdominal pain, peripheral edema, fever/chills.

## 2024-09-16 NOTE — ED PROVIDER NOTE - NS ED ATTENDING STATEMENT MOD
I have seen and examined this patient and fully participated in the care of this patient as the teaching attending.  The service was shared with the JASSON.  I reviewed and verified the documentation.

## 2024-09-16 NOTE — ED PROVIDER NOTE - TOBACCO USE
Never smoker Rituxan Counseling:  I discussed with the patient the risks of Rituxan infusions. Side effects can include infusion reactions, severe drug rashes including mucocutaneous reactions, reactivation of latent hepatitis and other infections and rarely progressive multifocal leukoencephalopathy.  All of the patient's questions and concerns were addressed.

## 2024-09-16 NOTE — ED ADULT NURSE NOTE - OBJECTIVE STATEMENT
Pt presents to ed ambulatory, Pt AOX4, speaking in full clear sentences, breathing equal and unlabored  c/o of possible vaginal fungal infection with some dysuria and odor. pt also c/o of possible foreign object in vagina,  LMP 8/31  Labs drawn using a butterfly. Labs sent to lab for processing, pending results  Urine sent  Plan of care ongoing

## 2024-09-16 NOTE — ED PROVIDER NOTE - NSFOLLOWUPINSTRUCTIONS_ED_ALL_ED_FT
Today in the emergency department you were evaluated for vaginal itching and examined for foreign body.  We did not see any foreign body inside of you.  Please follow-up with your patient portal for the results of your testing.  We will also call you if any of the results are positive.    Please follow up with your primary care physician within 1-2 weeks of discharge from the emergency department.  Please bring a copy of your results with you.  Please return to the emergency department for worsening of your symptoms.    You may take Acetaminophen over the counter as needed for pain and/or fever. Use as directed and see medication warnings.  You may take Ibuprofen over the counter as needed for pain and/or fever. Use as directed and see medication warnings.

## 2024-09-16 NOTE — ED ADULT TRIAGE NOTE - CHIEF COMPLAINT QUOTE
c/o of possible vaginal fungal infection with some dysuria and odor. pt also c/o of possible foreign object in vagina,  LMP 8/31

## 2024-09-16 NOTE — ED PROVIDER NOTE - PATIENT PORTAL LINK FT
You can access the FollowMyHealth Patient Portal offered by [facility] by registering at the following website: [email or web address]. By joining [employer]’s FollowMyHealth portal, you will also be able to view your health information using other applications (apps) compatible with our system.

## 2024-09-16 NOTE — ED PROVIDER NOTE - CLINICAL SUMMARY MEDICAL DECISION MAKING FREE TEXT BOX
24-year-old female on Ozempic and Farxiga presenting with vaginal itching and concern for vaginal foreign body.  Speculum exam without foreign body.  STI testing, UA/Ucx ordered.

## 2024-09-16 NOTE — ED PROVIDER NOTE - PHYSICAL EXAMINATION
GENERAL: NAD  HEAD: normocephalic, atraumatic  HEENT: normal conjunctiva, oral mucosa moist  CARDIAC: well perfused  PULM: speaking in full sentences  GI: abdomen nondistended, soft, nontender  : no suprapubic tenderness, speculum exam with normal cervix, white malodorous vaginal discharge, no foreign body observed  NEURO: moving all 4 extremities  MSK: no peripheral edema  SKIN: extremities warm

## 2024-09-17 LAB
C TRACH RRNA SPEC QL NAA+PROBE: SIGNIFICANT CHANGE UP
N GONORRHOEA RRNA SPEC QL NAA+PROBE: SIGNIFICANT CHANGE UP
SPECIMEN SOURCE: SIGNIFICANT CHANGE UP
T PALLIDUM AB TITR SER: NEGATIVE — SIGNIFICANT CHANGE UP

## 2024-09-18 LAB
CULTURE RESULTS: ABNORMAL
SPECIMEN SOURCE: SIGNIFICANT CHANGE UP

## 2024-09-18 RX ORDER — CASEIN/A,D/METHYLBNZ/PETROLAT
1 OINTMENT (GRAM) TOPICAL
Qty: 1 | Refills: 0
Start: 2024-09-18 | End: 2024-09-24

## 2024-09-19 RX ORDER — CEFPODOXIME PROXETIL 100 MG/5ML
1 GRANULE, FOR SUSPENSION ORAL
Qty: 14 | Refills: 0
Start: 2024-09-19 | End: 2024-09-25

## 2024-09-20 DIAGNOSIS — Z79.85 LONG-TERM (CURRENT) USE OF INJECTABLE NON-INSULIN ANTIDIABETIC DRUGS: ICD-10-CM

## 2024-09-20 DIAGNOSIS — N89.8 OTHER SPECIFIED NONINFLAMMATORY DISORDERS OF VAGINA: ICD-10-CM

## 2024-09-20 DIAGNOSIS — E11.9 TYPE 2 DIABETES MELLITUS WITHOUT COMPLICATIONS: ICD-10-CM

## 2024-11-15 ENCOUNTER — EMERGENCY (EMERGENCY)
Facility: HOSPITAL | Age: 24
LOS: 1 days | Discharge: HOME | End: 2024-11-15
Admitting: EMERGENCY MEDICINE
Payer: MEDICAID

## 2024-11-15 VITALS
RESPIRATION RATE: 16 BRPM | SYSTOLIC BLOOD PRESSURE: 112 MMHG | TEMPERATURE: 98 F | HEART RATE: 88 BPM | OXYGEN SATURATION: 99 % | DIASTOLIC BLOOD PRESSURE: 78 MMHG | WEIGHT: 162.04 LBS | HEIGHT: 62 IN

## 2024-11-15 PROCEDURE — 99284 EMERGENCY DEPT VISIT MOD MDM: CPT

## 2024-11-15 PROCEDURE — 73630 X-RAY EXAM OF FOOT: CPT | Mod: 26,LT

## 2024-11-15 RX ORDER — IBUPROFEN 200 MG
600 TABLET ORAL ONCE
Refills: 0 | Status: COMPLETED | OUTPATIENT
Start: 2024-11-15 | End: 2024-11-15

## 2024-11-15 RX ADMIN — Medication 600 MILLIGRAM(S): at 20:48

## 2024-11-15 NOTE — ED PROVIDER NOTE - NSFOLLOWUPINSTRUCTIONS_ED_ALL_ED_FT
You were seen in the ED for toe and foot pain.     Your X-rays did not show any fractures.     This can take up to 10 days to fully heal. If symptoms persist past this, then follow-up with your primary care doctor and a podiatrist.     For pain: Take Tylenol 650mg (Two regular strength 325 mg pills) every 4-6 hours or two extra strength 500mg tablets every 8 hours as needed for pain or fevers. Do not exceed 1000mg at one time or 4000mg in a 24 hour period. Take Motrin (also sold as Advil or Ibuprofen) 400-600 mg (two or three 200 mg over the counter pills) every 8 hours as needed for moderate pain or fevers-- take with food; do not take for more than 5 consecutive days.    Keep extremity elevated.  Apply cool compresses to affected area for 15 minutes, 3-4 times per day. Ambulate as tolerated.     Return to the ED for any new or worsening symptoms.

## 2024-11-15 NOTE — ED PROVIDER NOTE - OBJECTIVE STATEMENT
23 y/o F with no pertinent pmhx presents to the ED c/o 4th toe pain on left foot after stubbing toe against divider in swimming pool yesterday. pt states that she noticed some swelling over the toe which prompted visit to the ED. Pt able to ambulate on the foot. Reports some pain around the toe. Denies fever/chills, extremity paresthesias/weakness. NKDA. Pt did not take any medications for pain.

## 2024-11-15 NOTE — ED PROVIDER NOTE - PATIENT PORTAL LINK FT
You can access the FollowMyHealth Patient Portal offered by [facility] by registering at the following website: [email or web address]. By joining [employer]’s FollowMyHealth portal, you will also be able to view your health information using other applications (apps) compatible with our system.

## 2024-11-15 NOTE — ED PROVIDER NOTE - CLINICAL SUMMARY MEDICAL DECISION MAKING FREE TEXT BOX
23 y/o F with no reported pmhx presents to the ED c/o 4th toe pain on left foot after stubbing toe against divider in swimming pool yesterday. pt states that she noticed some swelling over the toe which prompted visit to the ED. Pt able to ambulate on the foot. Reports some pain around the toe. Denies fever/chills, extremity paresthesias/weakness. NKDA. Pt did not take any medications for pain.     Patient currently afebrile, hemodynamically stable, spO2 100%. Based on history and physical, differentials include but are not limited to acute fx vs sprain/ligamentous injury vs contusion. XR with no acute findings of fx or dislocation. Will d/c with supportive care instructions and strict return precautions.

## 2024-11-15 NOTE — ED PROVIDER NOTE - PHYSICAL EXAMINATION
General: Well appearing in no acute distress, alert and cooperative  Cardiac: Regular rate and regular rhythm, no murmurs  Resp: Unlabored respiratory effort, lungs CTAB, speaking in full sentences, no wheezes  MSK: No tenderness to palpation over 4th digit on left foot. Some swelling over distal interphalangeal joint. Able to range toes on left foot. DP pulses 2+ in LLE. Motor and strength 5/5 in LLE, sensation grossly intact.   Skin: Warm and dry. +superficial abrasion to side of medial aspect of 4th toe of left foot. No active bleeding. No surrounding erythema, warmth.   Neuro: AO x 3, moves all extremities symmetrically, Motor strength 5/5 bilateral  LE, sensation grossly intact. Normal gait.

## 2024-11-15 NOTE — ED ADULT NURSE NOTE - OBJECTIVE STATEMENT
Pt with left tor 4th digit injury during swimming on yesterday. Patient has a small lateral are with missing skin. Patient reports mild pain and swelling Pt with left tor 4th digit injury during swimming on yesterday. Patient has a small lateral are with missing skin. Patient reports mild pain and swelling. Pt does not appear to be in acute distress, pt denies CP, SOB, N/V.

## 2024-11-15 NOTE — ED ADULT TRIAGE NOTE - LOCATION:
Patient presents to ED with c/o \" I haven had my lithium and Seroquel in 5 days and I been sleeping on the streets. \" Left arm;

## 2024-11-15 NOTE — ED ADULT TRIAGE NOTE - CHIEF COMPLAINT QUOTE
Pt with complaint of a possible broken after her left 4th toe  hit the divider in a swimming pool yesterday.

## 2024-11-15 NOTE — ED ADULT NURSE NOTE - NSFALLUNIVINTERV_ED_ALL_ED
Bed/Stretcher in lowest position, wheels locked, appropriate side rails in place/Call bell, personal items and telephone in reach/Instruct patient to call for assistance before getting out of bed/chair/stretcher/Non-slip footwear applied when patient is off stretcher/[place] to call system/Physically safe environment - no spills, clutter or unnecessary equipment/Purposeful proactive rounding/Room/bathroom lighting operational, light cord in reach

## 2024-11-18 DIAGNOSIS — S93.505A UNSPECIFIED SPRAIN OF LEFT LESSER TOE(S), INITIAL ENCOUNTER: ICD-10-CM
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
## 2024-11-18 DIAGNOSIS — M79.675 PAIN IN LEFT TOE(S): ICD-10-CM
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
## 2025-03-20 NOTE — ED ADULT NURSE NOTE - SUICIDE SCREENING QUESTION 2
Action March 20, 2025 11:54 AM MT   Action Taken Sent a request for imaging from [facility], [facility], and [facility].   Sent [facility] reports to scan.      DIAGNOSIS:   Muscle weakness (generalized) [M62.81]  - Primary  Neck pain [M54.2]  Shoulder pain [M25.519]  Spinal stenosis [M48.00]   APPOINTMENT DATE: 04/02/2025   NOTES STATUS DETAILS   OFFICE NOTE from referring provider Media Tab Moncho Johnson MD  [facility]   OFFICE NOTE from other specialist Care Everywhere 09/16/2024 - Noemi Goodwin [facility]  - [facility]    06/18/2024 - Jeremy Broussard MD - [facility]    03/15/2019 - Boby Lane MD - [facility]   OPERATIVE REPORT Media Tab 09/20/2002 C6-7 ACDF @ [facility] by Dr. Boby Lane   EMG (for Spine) Care Everywhere 07/10/2024 - [facility]   MRI In process [facility]:  12/12/2024 - Left Shoulder    [facility]:  07/10/2024, 03/05/2019, 11/24/2015 - C Spine    [facility]:  11/21/2017 - C Spine      CT SCAN In process [facility]:  11/19/2024 - C Spine    [facility]:  03/19/2019 - Chest   XRAYS (IMAGES & REPORTS) In process [facility]:  08/29/2023, 11/14/2017 - C Spine       No

## 2025-09-10 ENCOUNTER — EMERGENCY (EMERGENCY)
Age: 25
LOS: 1 days | End: 2025-09-10
Admitting: EMERGENCY MEDICINE
Payer: MEDICAID

## 2025-09-10 VITALS
RESPIRATION RATE: 16 BRPM | TEMPERATURE: 98 F | SYSTOLIC BLOOD PRESSURE: 129 MMHG | OXYGEN SATURATION: 100 % | HEART RATE: 92 BPM | WEIGHT: 162.92 LBS | HEIGHT: 61 IN | DIASTOLIC BLOOD PRESSURE: 81 MMHG

## 2025-09-10 DIAGNOSIS — N76.0 ACUTE VAGINITIS: ICD-10-CM

## 2025-09-10 DIAGNOSIS — B37.31 ACUTE CANDIDIASIS OF VULVA AND VAGINA: ICD-10-CM

## 2025-09-10 DIAGNOSIS — Z79.85 LONG-TERM (CURRENT) USE OF INJECTABLE NON-INSULIN ANTIDIABETIC DRUGS: ICD-10-CM

## 2025-09-10 PROCEDURE — 99284 EMERGENCY DEPT VISIT MOD MDM: CPT

## 2025-09-10 RX ORDER — NAPROXEN SODIUM 275 MG
1 TABLET ORAL
Qty: 14 | Refills: 0
Start: 2025-09-10

## 2025-09-10 RX ORDER — NAPROXEN SODIUM 275 MG
500 TABLET ORAL ONCE
Refills: 0 | Status: COMPLETED | OUTPATIENT
Start: 2025-09-10 | End: 2025-09-10

## 2025-09-10 RX ORDER — METRONIDAZOLE 250 MG
1 TABLET ORAL
Qty: 21 | Refills: 0
Start: 2025-09-10 | End: 2025-09-16

## 2025-09-10 RX ORDER — METRONIDAZOLE 250 MG
500 TABLET ORAL ONCE
Refills: 0 | Status: COMPLETED | OUTPATIENT
Start: 2025-09-10 | End: 2025-09-10

## 2025-09-10 RX ORDER — FLUCONAZOLE 150 MG
150 TABLET ORAL ONCE
Refills: 0 | Status: COMPLETED | OUTPATIENT
Start: 2025-09-10 | End: 2025-09-10